# Patient Record
Sex: FEMALE | Race: WHITE | Employment: UNEMPLOYED | URBAN - METROPOLITAN AREA
[De-identification: names, ages, dates, MRNs, and addresses within clinical notes are randomized per-mention and may not be internally consistent; named-entity substitution may affect disease eponyms.]

---

## 2017-01-06 ENCOUNTER — OFFICE VISIT (OUTPATIENT)
Dept: PAIN MANAGEMENT | Age: 62
End: 2017-01-06

## 2017-01-06 VITALS
SYSTOLIC BLOOD PRESSURE: 119 MMHG | DIASTOLIC BLOOD PRESSURE: 80 MMHG | BODY MASS INDEX: 23.41 KG/M2 | WEIGHT: 128 LBS | HEART RATE: 89 BPM

## 2017-01-06 DIAGNOSIS — M54.12 CERVICAL RADICULOPATHY: ICD-10-CM

## 2017-01-06 DIAGNOSIS — G56.03 BILATERAL CARPAL TUNNEL SYNDROME: ICD-10-CM

## 2017-01-06 DIAGNOSIS — G54.4: ICD-10-CM

## 2017-01-06 DIAGNOSIS — G89.29 CHRONIC BILATERAL LOW BACK PAIN WITH RIGHT-SIDED SCIATICA: ICD-10-CM

## 2017-01-06 DIAGNOSIS — Z79.891 LONG TERM PRESCRIPTION OPIATE USE: ICD-10-CM

## 2017-01-06 DIAGNOSIS — M54.41 CHRONIC BILATERAL LOW BACK PAIN WITH RIGHT-SIDED SCIATICA: ICD-10-CM

## 2017-01-06 DIAGNOSIS — M47.22 OSTEOARTHRITIS OF SPINE WITH RADICULOPATHY, CERVICAL REGION: ICD-10-CM

## 2017-01-06 DIAGNOSIS — G89.4 CHRONIC PAIN SYNDROME: ICD-10-CM

## 2017-01-06 DIAGNOSIS — M54.17 LUMBOSACRAL NEURITIS: Primary | ICD-10-CM

## 2017-01-06 DIAGNOSIS — M48.02 CERVICAL SPINAL STENOSIS: ICD-10-CM

## 2017-01-06 DIAGNOSIS — M48.061 LUMBAR STENOSIS: ICD-10-CM

## 2017-01-06 DIAGNOSIS — M47.812 SPONDYLOSIS OF CERVICAL REGION WITHOUT MYELOPATHY OR RADICULOPATHY: ICD-10-CM

## 2017-01-06 RX ORDER — OXYCODONE HYDROCHLORIDE 20 MG/1
20 TABLET ORAL
Qty: 120 TAB | Refills: 0 | Status: SHIPPED | OUTPATIENT
Start: 2017-01-06 | End: 2017-03-29 | Stop reason: SDUPTHER

## 2017-01-06 RX ORDER — OXYCODONE HYDROCHLORIDE 20 MG/1
20 TABLET ORAL
Qty: 120 TAB | Refills: 0 | Status: SHIPPED | OUTPATIENT
Start: 2017-03-07 | End: 2017-04-06

## 2017-01-06 RX ORDER — OXYCODONE HYDROCHLORIDE 20 MG/1
20 TABLET ORAL
Qty: 120 TAB | Refills: 0 | Status: SHIPPED | OUTPATIENT
Start: 2017-02-06 | End: 2017-03-29 | Stop reason: SDUPTHER

## 2017-01-06 NOTE — MR AVS SNAPSHOT
Visit Information Date & Time Provider Department Dept. Phone Encounter #  
 1/6/2017  1:20 PM Jennifer Chance MD [facility] 21  Follow-up Instructions Return in about 3 months (around 4/6/2017). Upcoming Health Maintenance Date Due Hepatitis C Screening 1955 Pneumococcal 19-64 Medium Risk (1 of 1 - PPSV23) 5/4/1975 DTaP/Tdap/Td series (1 - Tdap) 5/4/1977 PAP AKA CERVICAL CYTOLOGY 5/4/1977 BREAST CANCER SCRN MAMMOGRAM 5/4/2006 FOBT Q 1 YEAR AGE 50-75 5/4/2006 ZOSTER VACCINE AGE 60> 5/4/2016 INFLUENZA AGE 9 TO ADULT 8/1/2016 Allergies as of 1/6/2017  Review Complete On: 1/6/2017 By: Jennifer Chance MD  
  
 Severity Noted Reaction Type Reactions Pcn [Penicillins]  07/09/2013    Swelling Current Immunizations  Never Reviewed No immunizations on file. Not reviewed this visit You Were Diagnosed With   
  
 Codes Comments Lumbosacral neuritis    -  Primary ICD-10-CM: M54.17 ICD-9-CM: 724.4 Lumbosacral root lesions, not elsewhere classified     ICD-10-CM: G54.4 ICD-9-CM: 353.4 Bilateral carpal tunnel syndrome     ICD-10-CM: G56.03 
ICD-9-CM: 354.0 Cervical radiculopathy     ICD-10-CM: M54.12 
ICD-9-CM: 723.4 Osteoarthritis of spine with radiculopathy, cervical region     ICD-10-CM: M47.22 
ICD-9-CM: 721.0 Chronic bilateral low back pain with right-sided sciatica     ICD-10-CM: M54.41, G89.29 ICD-9-CM: 724.2, 724.3, 338.29 Long term prescription opiate use     ICD-10-CM: S37.321 ICD-9-CM: V58.69 Chronic pain syndrome     ICD-10-CM: G89.4 ICD-9-CM: 338.4 Lumbar stenosis     ICD-10-CM: M48.06 
ICD-9-CM: 724.02 Spondylosis of cervical region without myelopathy or radiculopathy     ICD-10-CM: M47.812 ICD-9-CM: 721.0 Cervical spinal stenosis     ICD-10-CM: M48.02 
ICD-9-CM: 723.0 Vitals BP Pulse Weight(growth percentile) BMI OB Status Smoking Status 119/80 89 128 lb (58.1 kg) 23.41 kg/m2 Postmenopausal Current Every Day Smoker Vitals History BMI and BSA Data Body Mass Index Body Surface Area  
 23.41 kg/m 2 1.59 m 2 Preferred Pharmacy Pharmacy Name Phone [place] [address] [phone] Your Updated Medication List  
  
   
This list is accurate as of: 1/6/17  2:03 PM.  Always use your most recent med list.  
  
  
  
  
 aspirin delayed-release 81 mg tablet Take  by mouth daily. Gal Wade Use as directed to assist with ambulation DULoxetine 20 mg capsule Commonly known as:  CYMBALTA Take 20 mg by mouth daily. ergocalciferol 50,000 unit capsule Commonly known as:  ERGOCALCIFEROL Take 50,000 Units by mouth.  
  
 gabapentin 300 mg capsule Commonly known as:  NEURONTIN Take 1 capsule once in the evening for one week, then 2 times a day thereafter. #53 LATUDA 20 mg Tab tablet Generic drug:  lurasidone Take  by mouth. PriLOSEC 20 mg capsule Generic drug:  omeprazole Take 20 mg by mouth daily. primidone 50 mg tablet Commonly known as: MYSOLINE Take 50 mg by mouth two (2) times a day. pyridoxine (vitamin B6) 100 mg tablet Commonly known as:  VITAMIN B-6 Take 100 mg by mouth daily. raNITIdine 75 mg tablet Commonly known as:  ZANTAC Take 75 mg by mouth two (2) times a day. * ROXICODONE 15 mg immediate release tablet Generic drug:  oxyCODONE IR Take 15 mg by mouth four (4) times daily. * oxyCODONE IR 20 mg immediate release tablet Commonly known as:  Vida Guzman Take 1 Tab by mouth four (4) times daily as needed for up to 30 days. Max Daily Amount: 80 mg. Indications: PAIN  
  
 * oxyCODONE IR 20 mg immediate release tablet Commonly known as:  Vida Guzman  
 Take 1 Tab by mouth four (4) times daily as needed for up to 30 days. Max Daily Amount: 80 mg. Indications: PAIN Start taking on:  2/6/2017 * oxyCODONE IR 20 mg immediate release tablet Commonly known as:  Kenyetta Oh Take 1 Tab by mouth four (4) times daily as needed for up to 30 days. Max Daily Amount: 80 mg. Indications: PAIN Start taking on:  3/7/2017  
  
 simvastatin 20 mg tablet Commonly known as:  ZOCOR Take  by mouth nightly. sucralfate 1 gram tablet Commonly known as:  Serena Murray Take 1 g by mouth four (4) times daily. VALIUM 5 mg tablet Generic drug:  diazePAM  
Take 5 mg by mouth daily as needed. * Notice: This list has 4 medication(s) that are the same as other medications prescribed for you. Read the directions carefully, and ask your doctor or other care provider to review them with you. Prescriptions Printed Refills  
 oxyCODONE IR (ROXICODONE) 20 mg immediate release tablet 0 Starting on: 3/7/2017 Sig: Take 1 Tab by mouth four (4) times daily as needed for up to 30 days. Max Daily Amount: 80 mg. Indications: PAIN Class: Print Route: Oral  
 oxyCODONE IR (ROXICODONE) 20 mg immediate release tablet 0 Starting on: 2/6/2017 Sig: Take 1 Tab by mouth four (4) times daily as needed for up to 30 days. Max Daily Amount: 80 mg. Indications: PAIN Class: Print Route: Oral  
 oxyCODONE IR (ROXICODONE) 20 mg immediate release tablet 0 Sig: Take 1 Tab by mouth four (4) times daily as needed for up to 30 days. Max Daily Amount: 80 mg. Indications: PAIN Class: Print Route: Oral  
  
Follow-up Instructions Return in about 3 months (around 4/6/2017). Introducing [facility] SERVICES! Yeimy Hutton introduces [employer] patient portal. Now you can access parts of your medical record, email your doctor's office, and request medication refills online. 1. In your internet browser, go to [email or web address] 2. Click on the First Time User? Click Here link in the Sign In box. You will see the New Member Sign Up page. 3. Enter your [employer] Access Code exactly as it appears below. You will not need to use this code after youve completed the sign-up process. If you do not sign up before the expiration date, you must request a new code. · [employer] Access Code: [email or web address] Expires: 1/12/2017 12:08 PM 
 
4. Enter the last four digits of your Social Security Number (xxxx) and Date of Birth (mm/dd/yyyy) as indicated and click Submit. You will be taken to the next sign-up page. 5. Create a [employer]iModel ID. This will be your [employer]iModel login ID and cannot be changed, so think of one that is secure and easy to remember. 6. Create a [employer] password. You can change your password at any time. 7. Enter your Password Reset Question and Answer. This can be used at a later time if you forget your password. 8. Enter your e-mail address. You will receive e-mail notification when new information is available in [address] 9. Click Sign Up. You can now view and download portions of your medical record. 10. Click the Download Summary menu link to download a portable copy of your medical information. If you have questions, please visit the Frequently Asked Questions section of the [employer] website. Remember, [employer] is NOT to be used for urgent needs. For medical emergencies, dial 911. Now available from your iPhone and Android! Please provide this summary of care documentation to your next provider. Your primary care clinician is listed as Christiano Bernard. If you have any questions after today's visit, please call [phone].

## 2017-01-06 NOTE — PROGRESS NOTES
Nursing Notes    Patient presents to the office today in follow-up. Patient rates her pain at 3/10 on the numerical pain scale. Reviewed medications with counts as follows:    Rx Date filled Qty Dispensed Pill Count Last Dose Short   tamara 20 12/10/16 120 16 1/6/17 no         Comments:     POC UDS was not performed in office today    Any new labs or imaging since last appointment? YES, blood work for c/u  Have you been to an emergency room (ER) or urgent care clinic since your last visit? NO            Have you been hospitalized since your last visit? NO     If yes, where, when, and reason for visit? Have you seen or consulted any other health care providers outside of the [address] [place] [address]  since your last visit? NO     If yes, where, when, and reason for visit? Ms. Leann Vazquez has a reminder for a \"due or due soon\" health maintenance. I have asked that she contact her primary care provider for follow-up on this health maintenance.

## 2017-01-06 NOTE — PROGRESS NOTES
HISTORY OF PRESENT ILLNESS  Bibiana Scott is a 62 y.o. female. HPI she returns for follow-up of chronic, severe pain involving the cervical spine related to underlying cervical stenosis, degenerative disc disease, and radiculopathy as well as pain involving the lumbar spine with radiation down the right lower extremity, anteriorly posteriorly, related to degenerative disc disease, spondylosis, and radiculopathy. She also suffers from bilateral median nerve entrapment carpal tunnel. Pain has been under excellent control, averaging 3 out of 10. Pain level today 3 out of 10, outcome 10/28,(The lower the upper number, the better the outcome)  Physical activity and mobility as well as mood are fair, sleep is poor. No reported side effects. A review of the [place] prescription monitoring program does not identify any inconsistency. Review of Systems   Constitutional: Positive for malaise/fatigue and weight loss. Negative for chills and fever. HENT: Negative for congestion and sore throat. Eyes: Negative for blurred vision (h/o hypoglycemia which will cause some blurry vision) and double vision. Respiratory: Positive for cough. Negative for shortness of breath. COPD  Bronchitis (using inhaler and on antibiotics)  Cigarette smoker   Cardiovascular: Negative for chest pain and leg swelling. Gastrointestinal: Positive for heartburn. Negative for constipation, diarrhea, nausea and vomiting. Genitourinary: Negative for hematuria. Musculoskeletal: Positive for back pain, joint pain (left shoulder), myalgias and neck pain. Negative for falls. Skin: Negative. Neurological: Positive for tremors (primidone manages), sensory change (right arm) and headaches (\"constantly\"-h/o migraines (had recent brain MRI(). Negative for dizziness, tingling, seizures and weakness. Endo/Heme/Allergies: Negative for environmental allergies. Does not bruise/bleed easily.    Psychiatric/Behavioral: Positive for depression. Negative for substance abuse and suicidal ideas. The patient is nervous/anxious and has insomnia. Bipolar  Sees psychiatry monthly (Dr. Adrain Soulier)       Physical Exam   Constitutional: She is oriented to person, place, and time. She appears well-developed and well-nourished. No distress. HENT:   Head: Normocephalic. Eyes: Conjunctivae and EOM are normal. Pupils are equal, round, and reactive to light.   glasses   Neck: Normal range of motion. No thyromegaly present. Painful ROM   Pulmonary/Chest: Effort normal. No respiratory distress. Musculoskeletal: She exhibits tenderness (neck/lumbar). She exhibits no edema. Cervical back: She exhibits decreased range of motion, tenderness, pain and spasm. Lumbar back: She exhibits decreased range of motion, tenderness, pain and spasm. Neurological: She is alert and oriented to person, place, and time. No cranial nerve deficit (grossly intact). Gait (antalgic) abnormal.   Psychiatric: She has a normal mood and affect. Her behavior is normal. Judgment and thought content normal.   Nursing note and vitals reviewed. ASSESSMENT and PLAN  Encounter Diagnoses   Name Primary?  Lumbosacral neuritis Yes    Lumbosacral root lesions, not elsewhere classified     Bilateral carpal tunnel syndrome     Cervical radiculopathy     Osteoarthritis of spine with radiculopathy, cervical region     Chronic bilateral low back pain with right-sided sciatica     Long term prescription opiate use     Chronic pain syndrome     Lumbar stenosis     Spondylosis of cervical region without myelopathy or radiculopathy     Cervical spinal stenosis      She will continue on her current analgesic regimen at this is providing excellent pain control with improve functionality and minimal side effects. 3 month reassess her    No concerns are raised for misuse, abuse, or diversion.     1. Pain medications are prescribed with the objective of pain relief and improved physical and psychosocial function in this patient. 2. Counseled patient on proper use of prescribed medications and reviewed opioid contract. 3. Counseled patient about chronic medical conditions and their relationship to anxiety and depression and recommended mental health support as needed. 4. Reviewed with patient self-help tools, home exercise, and lifestyle changes to assist the patient in self-management of symptoms. 5. Advised patient to have a primary care provider to continue care for health maintenance and general medical conditions and support for referral to specialty care as needed. 6. Reviewed with patient the treatment plan, goals of treatment plan, and limitations of treatment plan, to include the potential for side effects from medications and procedures. If side effects occur, it is the responsibility of the patient to inform the clinic so that a change in the treatment plan can be made in a safe manner. The patient is advised that stopping prescribed medication may cause an increase in symptoms and possible medication withdrawal symptoms. The patient is informed an emergency room evaluation may be necessary if this occurs. DISPOSITION: The patients condition and plan were discussed at length and all questions were answered. The patient agrees with the plan.     Counseling occupied > 50% of visit:  Total time: 30 minutes

## 2017-03-29 ENCOUNTER — OFFICE VISIT (OUTPATIENT)
Dept: PAIN MANAGEMENT | Age: 62
End: 2017-03-29

## 2017-03-29 VITALS
SYSTOLIC BLOOD PRESSURE: 141 MMHG | HEART RATE: 104 BPM | WEIGHT: 128 LBS | DIASTOLIC BLOOD PRESSURE: 90 MMHG | BODY MASS INDEX: 23.41 KG/M2

## 2017-03-29 DIAGNOSIS — Z79.891 LONG TERM PRESCRIPTION OPIATE USE: ICD-10-CM

## 2017-03-29 DIAGNOSIS — M54.17 LUMBOSACRAL NEURITIS: Primary | ICD-10-CM

## 2017-03-29 DIAGNOSIS — M54.12 CERVICAL RADICULOPATHY: ICD-10-CM

## 2017-03-29 DIAGNOSIS — G56.03 BILATERAL CARPAL TUNNEL SYNDROME: ICD-10-CM

## 2017-03-29 DIAGNOSIS — G89.29 CHRONIC BILATERAL LOW BACK PAIN WITH RIGHT-SIDED SCIATICA: ICD-10-CM

## 2017-03-29 DIAGNOSIS — M48.061 LUMBAR STENOSIS: ICD-10-CM

## 2017-03-29 DIAGNOSIS — G89.4 CHRONIC PAIN SYNDROME: ICD-10-CM

## 2017-03-29 DIAGNOSIS — M48.02 CERVICAL SPINAL STENOSIS: ICD-10-CM

## 2017-03-29 DIAGNOSIS — Z79.899 ENCOUNTER FOR LONG-TERM (CURRENT) USE OF MEDICATIONS: ICD-10-CM

## 2017-03-29 DIAGNOSIS — M47.22 OSTEOARTHRITIS OF SPINE WITH RADICULOPATHY, CERVICAL REGION: ICD-10-CM

## 2017-03-29 DIAGNOSIS — M54.41 CHRONIC BILATERAL LOW BACK PAIN WITH RIGHT-SIDED SCIATICA: ICD-10-CM

## 2017-03-29 LAB
ALCOHOL UR POC: NORMAL
AMPHETAMINES UR POC: NEGATIVE
BARBITURATES UR POC: NEGATIVE
BENZODIAZEPINES UR POC: NORMAL
BUPRENORPHINE UR POC: NORMAL
CANNABINOIDS UR POC: NEGATIVE
CARISOPRODOL UR POC: NORMAL
COCAINE UR POC: NEGATIVE
FENTANYL UR POC: NORMAL
MDMA/ECSTASY UR POC: NEGATIVE
METHADONE UR POC: NEGATIVE
METHAMPHETAMINE UR POC: NEGATIVE
METHYLPHENIDATE UR POC: NEGATIVE
OPIATES UR POC: NEGATIVE
OXYCODONE UR POC: NORMAL
PHENCYCLIDINE UR POC: NEGATIVE
PROPOXYPHENE UR POC: NORMAL
TRAMADOL UR POC: NORMAL
TRICYCLICS UR POC: NEGATIVE

## 2017-03-29 RX ORDER — OXYCODONE HYDROCHLORIDE 20 MG/1
20 TABLET ORAL
Qty: 120 TAB | Refills: 0 | Status: SHIPPED | OUTPATIENT
Start: 2017-05-03 | End: 2017-05-26 | Stop reason: SDUPTHER

## 2017-03-29 RX ORDER — MORPHINE SULFATE 15 MG/1
15 TABLET, FILM COATED, EXTENDED RELEASE ORAL 2 TIMES DAILY
Qty: 60 TAB | Refills: 0 | Status: SHIPPED | OUTPATIENT
Start: 2017-03-29 | End: 2017-05-26 | Stop reason: SDUPTHER

## 2017-03-29 RX ORDER — OXYCODONE HYDROCHLORIDE 20 MG/1
20 TABLET ORAL
Qty: 120 TAB | Refills: 0 | Status: SHIPPED | OUTPATIENT
Start: 2017-04-05 | End: 2017-05-26 | Stop reason: SDUPTHER

## 2017-03-29 RX ORDER — BUPROPION HYDROCHLORIDE 100 MG/1
300 TABLET ORAL
COMMUNITY
End: 2018-05-25

## 2017-03-29 RX ORDER — MORPHINE SULFATE 15 MG/1
15 TABLET, FILM COATED, EXTENDED RELEASE ORAL 2 TIMES DAILY
Qty: 60 TAB | Refills: 0 | Status: SHIPPED | OUTPATIENT
Start: 2017-04-27 | End: 2017-05-26 | Stop reason: SDUPTHER

## 2017-03-29 NOTE — MR AVS SNAPSHOT
Visit Information Date & Time Provider Department Dept. Phone Encounter #  
 3/29/2017  2:20 PM Rangel Duarte MD [facility] [phone] Follow-up Instructions Return in about 2 months (around 5/29/2017). Your Appointments 5/24/2017  2:00 PM  
Follow Up with YANG Balderas [facility] (BC SCHEDULING) Appt Note: return in 2 months [address] [facility] [address] [phone] Crystal Waller [phone] Upcoming Health Maintenance Date Due Hepatitis C Screening 1955 Pneumococcal 19-64 Medium Risk (1 of 1 - PPSV23) 5/4/1975 DTaP/Tdap/Td series (1 - Tdap) 5/4/1977 PAP AKA CERVICAL CYTOLOGY 5/4/1977 BREAST CANCER SCRN MAMMOGRAM 5/4/2006 FOBT Q 1 YEAR AGE 50-75 5/4/2006 ZOSTER VACCINE AGE 60> 5/4/2016 INFLUENZA AGE 9 TO ADULT 8/1/2016 Allergies as of 3/29/2017  Review Complete On: 3/29/2017 By: Rangel Duarte MD  
  
 Severity Noted Reaction Type Reactions Pcn [Penicillins]  07/09/2013    Swelling Current Immunizations  Never Reviewed No immunizations on file. Not reviewed this visit You Were Diagnosed With   
  
 Codes Comments Lumbosacral neuritis    -  Primary ICD-10-CM: M54.17 ICD-9-CM: 724.4 Encounter for long-term (current) use of medications     ICD-10-CM: Z79.899 ICD-9-CM: V58.69 Bilateral carpal tunnel syndrome     ICD-10-CM: G56.03 
ICD-9-CM: 354.0 Cervical radiculopathy     ICD-10-CM: M54.12 
ICD-9-CM: 723.4 Osteoarthritis of spine with radiculopathy, cervical region     ICD-10-CM: M47.22 
ICD-9-CM: 721.0 Chronic bilateral low back pain with right-sided sciatica     ICD-10-CM: M54.41, G89.29 ICD-9-CM: 724.2, 724.3, 338.29 Long term prescription opiate use     ICD-10-CM: G12.157 ICD-9-CM: V58.69 Chronic pain syndrome     ICD-10-CM: G89.4 ICD-9-CM: 338.4 Lumbar stenosis     ICD-10-CM: M48.06 
ICD-9-CM: 724.02 Cervical spinal stenosis     ICD-10-CM: M48.02 
ICD-9-CM: 723.0 Vitals BP Pulse Weight(growth percentile) BMI OB Status Smoking Status 141/90 (!) 104 128 lb (58.1 kg) 23.41 kg/m2 Postmenopausal Current Every Day Smoker BMI and BSA Data Body Mass Index Body Surface Area  
 23.41 kg/m 2 1.59 m 2 Preferred Pharmacy Pharmacy Name Phone [address] [place] [address] [phone] Your Updated Medication List  
  
   
This list is accurate as of: 3/29/17  2:45 PM.  Always use your most recent med list.  
  
  
  
  
 aspirin delayed-release 81 mg tablet Take  by mouth daily. buPROPion 100 mg tablet Commonly known as:  [facility] Take 300 mg by mouth. Eleanora Mi Use as directed to assist with ambulation DULoxetine 20 mg capsule Commonly known as:  CYMBALTA Take 20 mg by mouth daily. ergocalciferol 50,000 unit capsule Commonly known as:  ERGOCALCIFEROL Take 50,000 Units by mouth.  
  
 gabapentin 300 mg capsule Commonly known as:  NEURONTIN Take 1 capsule once in the evening for one week, then 2 times a day thereafter. #53 LATUDA 20 mg Tab tablet Generic drug:  lurasidone Take  by mouth. * morphine CR 15 mg CR tablet Commonly known as:  MS CONTIN Take 1 Tab by mouth two (2) times a day for 30 days. Max Daily Amount: 30 mg. For chronic pain due to fill 1/8/16  Indications: Chronic Pain, Severe Pain * morphine CR 15 mg CR tablet Commonly known as:  MS CONTIN Take 1 Tab by mouth two (2) times a day for 30 days. Max Daily Amount: 30 mg. Indications: Chronic Pain, Severe Pain Start taking on:  4/27/2017 PriLOSEC 20 mg capsule Generic drug:  omeprazole Take 20 mg by mouth daily. primidone 50 mg tablet Commonly known as: MYSOLINE Take 50 mg by mouth two (2) times a day. pyridoxine (vitamin B6) 100 mg tablet Commonly known as:  VITAMIN B-6 Take 100 mg by mouth daily. raNITIdine 75 mg tablet Commonly known as:  ZANTAC Take 75 mg by mouth two (2) times a day. * ROXICODONE 15 mg immediate release tablet Generic drug:  oxyCODONE IR Take 15 mg by mouth four (4) times daily. * oxyCODONE IR 20 mg immediate release tablet Commonly known as:  Rex Salen Take 1 Tab by mouth four (4) times daily as needed for up to 30 days. Max Daily Amount: 80 mg. Indications: PAIN  
  
 * oxyCODONE IR 20 mg immediate release tablet Commonly known as:  [place]downe Salen Take 1 Tab by mouth four (4) times daily as needed for up to 30 days. Max Daily Amount: 80 mg. Indications: Pain Start taking on:  4/5/2017 * oxyCODONE IR 20 mg immediate release tablet Commonly known as:  [place]ne Salen Take 1 Tab by mouth four (4) times daily as needed for up to 30 days. Max Daily Amount: 80 mg. Indications: Pain Start taking on:  5/3/2017  
  
 simvastatin 20 mg tablet Commonly known as:  ZOCOR Take  by mouth nightly. sucralfate 1 gram tablet Commonly known as:  Charm Oh Take 1 g by mouth four (4) times daily. VALIUM 5 mg tablet Generic drug:  diazePAM  
Take 5 mg by mouth daily as needed. * Notice: This list has 6 medication(s) that are the same as other medications prescribed for you. Read the directions carefully, and ask your doctor or other care provider to review them with you. Prescriptions Printed Refills  
 morphine CR (MS CONTIN) 15 mg CR tablet 0 Starting on: 4/27/2017 Sig: Take 1 Tab by mouth two (2) times a day for 30 days. Max Daily Amount: 30 mg. Indications: Chronic Pain, Severe Pain Class: Print Route: Oral  
 morphine CR (MS CONTIN) 15 mg CR tablet 0 Sig: Take 1 Tab by mouth two (2) times a day for 30 days. Max Daily Amount: 30 mg.  For chronic pain due to fill 1/8/16  Indications: Chronic Pain, Severe Pain Class: Print Route: Oral  
 oxyCODONE IR (ROXICODONE) 20 mg immediate release tablet 0 Starting on: 5/3/2017 Sig: Take 1 Tab by mouth four (4) times daily as needed for up to 30 days. Max Daily Amount: 80 mg. Indications: Pain Class: Print Route: Oral  
 oxyCODONE IR (ROXICODONE) 20 mg immediate release tablet 0 Starting on: 4/5/2017 Sig: Take 1 Tab by mouth four (4) times daily as needed for up to 30 days. Max Daily Amount: 80 mg. Indications: Pain Class: Print Route: Oral  
  
We Performed the Following AMB POC DRUG SCREEN () [ [facility]] DRUG SCREEN [[id number] Custom] Follow-up Instructions Return in about 2 months (around 5/29/2017). Patient Instructions Current health maintenance issues were reviewed and the patient was advised to followup with his/her PCP for completion of these items. Introducing [facility]! Tatyana Ruiz introduces [employer] patient portal. Now you can access parts of your medical record, email your doctor's office, and request medication refills online. 1. In your internet browser, go to [email or web address] 2. Click on the First Time User? Click Here link in the Sign In box. You will see the New Member Sign Up page. 3. Enter your [employer] Access Code exactly as it appears below. You will not need to use this code after youve completed the sign-up process. If you do not sign up before the expiration date, you must request a new code. · [employer] Access Code: [email or web address] Expires: 5/31/2017  2:45 PM 
 
4. Enter the last four digits of your Social Security Number (xxxx) and Date of Birth (mm/dd/yyyy) as indicated and click Submit. You will be taken to the next sign-up page. 5. Create a [employer] ID. This will be your [employer] login ID and cannot be changed, so think of one that is secure and easy to remember. 6. Create a [employer] password. You can change your password at any time. 7. Enter your Password Reset Question and Answer. This can be used at a later time if you forget your password. 8. Enter your e-mail address. You will receive e-mail notification when new information is available in [address] 9. Click Sign Up. You can now view and download portions of your medical record. 10. Click the Download Summary menu link to download a portable copy of your medical information. If you have questions, please visit the Frequently Asked Questions section of the [employer] website. Remember, [employer] is NOT to be used for urgent needs. For medical emergencies, dial 911. Now available from your iPhone and Android! Please provide this summary of care documentation to your next provider. Your primary care clinician is listed as Linsey Leija. If you have any questions after today's visit, please call [phone].

## 2017-03-29 NOTE — PROGRESS NOTES
HISTORY OF PRESENT ILLNESS  Abdon Rhodes is a 62 y.o. female. HPI she returns for follow-up of chronic, severe pain involving the cervical spine related to underlying cervical stenosis, degenerative disc disease, and radiculopathy as well as pain involving the lumbar spine with radiation down the right lower extremity, anteriorly posteriorly, related to degenerative disc disease, spondylosis, and radiculopathy. She also suffers from bilateral median nerve entrapment carpal tunnel. She has noted that her headaches are becoming more problematic once again. She indicates that this was alleviated to a great degree by the use of MS Contin previously and this will be reinitiated at 15 mg twice daily. Pain level today 5 out of 10, outcome 12/28,(The lower the upper number, the better the outcome)  Physical activity and mobility as well as mood are fair, sleep is poor. No reported side effects. A current review of the  does not identify any inconsistency. UDS obtained and reviewed; formal confirmation from laboratory is pending. Review of Systems   Constitutional: Positive for malaise/fatigue and weight loss. Negative for chills and fever. HENT: Negative for congestion and sore throat. Eyes: Negative for blurred vision (h/o hypoglycemia which will cause some blurry vision) and double vision. Respiratory: Positive for cough. Negative for shortness of breath. COPD  Bronchitis (using inhaler and on antibiotics)  Cigarette smoker   Cardiovascular: Negative for chest pain and leg swelling. Gastrointestinal: Positive for heartburn. Negative for constipation, diarrhea, nausea and vomiting. Genitourinary: Negative for hematuria. Musculoskeletal: Positive for back pain, joint pain (left shoulder), myalgias and neck pain. Negative for falls. Skin: Negative.     Neurological: Positive for tremors (primidone manages), sensory change (right arm) and headaches (\"constantly\"-h/o migraines (had recent brain MRI(). Negative for dizziness, tingling, seizures and weakness. Endo/Heme/Allergies: Negative for environmental allergies. Does not bruise/bleed easily. Psychiatric/Behavioral: Positive for depression. Negative for substance abuse and suicidal ideas. The patient is nervous/anxious and has insomnia. Bipolar  Sees psychiatry monthly (Dr. Abdiaziz Sotelo)   All other systems reviewed and are negative. Physical Exam   Constitutional: She is oriented to person, place, and time. She appears well-developed and well-nourished. No distress. HENT:   Head: Normocephalic. Eyes: Conjunctivae and EOM are normal. Pupils are equal, round, and reactive to light.   glasses   Neck: Normal range of motion. No thyromegaly present. Painful ROM   Pulmonary/Chest: Effort normal. No respiratory distress. Musculoskeletal: She exhibits tenderness (neck/lumbar). She exhibits no edema. Cervical back: She exhibits decreased range of motion, tenderness, pain and spasm. Lumbar back: She exhibits decreased range of motion, tenderness, pain and spasm. Neurological: She is alert and oriented to person, place, and time. No cranial nerve deficit (grossly intact). Gait (antalgic) abnormal.   Psychiatric: She has a normal mood and affect. Her behavior is normal. Judgment and thought content normal.   Nursing note and vitals reviewed. ASSESSMENT and PLAN  Encounter Diagnoses   Name Primary?  Lumbosacral neuritis Yes    Encounter for long-term (current) use of medications     Bilateral carpal tunnel syndrome     Cervical radiculopathy     Osteoarthritis of spine with radiculopathy, cervical region     Chronic bilateral low back pain with right-sided sciatica     Long term prescription opiate use     Chronic pain syndrome     Lumbar stenosis     Cervical spinal stenosis      Treatment plan as noted above.   Because the patient's current regimen places him/her at increased risk for possible overdose, a prescription for naloxone nasal spray is being provided. The patient understands that this medication is only to be used in the setting of a possible overdose and that inadvertent use of this medication could precipitate overt withdrawal.    2 month reassess her    No concerns are raised for misuse, abuse, or diversion. 1. Pain medications are prescribed with the objective of pain relief and improved physical and psychosocial function in this patient. 2. Counseled patient on proper use of prescribed medications and reviewed opioid contract. 3. Counseled patient about chronic medical conditions and their relationship to anxiety and depression and recommended mental health support as needed. 4. Reviewed with patient self-help tools, home exercise, and lifestyle changes to assist the patient in self-management of symptoms. 5. Advised patient to have a primary care provider to continue care for health maintenance and general medical conditions and support for referral to specialty care as needed. 6. Reviewed with patient the treatment plan, goals of treatment plan, and limitations of treatment plan, to include the potential for side effects from medications and procedures. If side effects occur, it is the responsibility of the patient to inform the clinic so that a change in the treatment plan can be made in a safe manner. The patient is advised that stopping prescribed medication may cause an increase in symptoms and possible medication withdrawal symptoms. The patient is informed an emergency room evaluation may be necessary if this occurs. DISPOSITION: The patients condition and plan were discussed at length and all questions were answered. The patient agrees with the plan.     Counseling occupied > 50% of visit:  Total time: 30 minutes

## 2017-03-29 NOTE — PROGRESS NOTES
Nursing Notes    Patient presents to the office today in follow-up. Patient rates her pain at 5/10 on the numerical pain scale. Reviewed medications with counts as follows:    Rx Date filled Qty Dispensed Pill Count Last Dose Short   tamara 20 3/7/17 120 40 3/29/17 no       Comments:     POC UDS was performed in office today    Any new labs or imaging since last appointment? Lung CT for c/u    Have you been to an emergency room (ER) or urgent care clinic since your last visit? NO            Have you been hospitalized since your last visit? NO     If yes, where, when, and reason for visit? Have you seen or consulted any other health care providers outside of the [address] [place] [address]  since your last visit? Yes, pulmonologist   If yes, where, when, and reason for visit? Ms. Yandel Hood has a reminder for a \"due or due soon\" health maintenance. I have asked that she contact her primary care provider for follow-up on this health maintenance.

## 2017-05-26 ENCOUNTER — OFFICE VISIT (OUTPATIENT)
Dept: PAIN MANAGEMENT | Age: 62
End: 2017-05-26

## 2017-05-26 VITALS
SYSTOLIC BLOOD PRESSURE: 147 MMHG | HEART RATE: 76 BPM | WEIGHT: 128 LBS | BODY MASS INDEX: 23.41 KG/M2 | DIASTOLIC BLOOD PRESSURE: 89 MMHG

## 2017-05-26 DIAGNOSIS — M48.02 CERVICAL SPINAL STENOSIS: ICD-10-CM

## 2017-05-26 DIAGNOSIS — M47.816 SPONDYLOSIS OF LUMBAR REGION WITHOUT MYELOPATHY OR RADICULOPATHY: ICD-10-CM

## 2017-05-26 DIAGNOSIS — M47.26 OSTEOARTHRITIS OF SPINE WITH RADICULOPATHY, LUMBAR REGION: ICD-10-CM

## 2017-05-26 DIAGNOSIS — M54.17 LUMBOSACRAL NEURITIS: ICD-10-CM

## 2017-05-26 DIAGNOSIS — M48.061 LUMBAR STENOSIS: ICD-10-CM

## 2017-05-26 DIAGNOSIS — G89.29 CHRONIC BILATERAL LOW BACK PAIN WITH RIGHT-SIDED SCIATICA: ICD-10-CM

## 2017-05-26 DIAGNOSIS — G89.4 CHRONIC PAIN SYNDROME: ICD-10-CM

## 2017-05-26 DIAGNOSIS — M54.41 CHRONIC BILATERAL LOW BACK PAIN WITH RIGHT-SIDED SCIATICA: ICD-10-CM

## 2017-05-26 DIAGNOSIS — M47.22 OSTEOARTHRITIS OF SPINE WITH RADICULOPATHY, CERVICAL REGION: ICD-10-CM

## 2017-05-26 DIAGNOSIS — M47.812 SPONDYLOSIS OF CERVICAL REGION WITHOUT MYELOPATHY OR RADICULOPATHY: ICD-10-CM

## 2017-05-26 RX ORDER — NALOXONE HYDROCHLORIDE 4 MG/.1ML
4 SPRAY NASAL AS NEEDED
Qty: 1 PACKAGE | Refills: 0 | Status: SHIPPED | OUTPATIENT
Start: 2017-05-26

## 2017-05-26 RX ORDER — OXYCODONE HYDROCHLORIDE 20 MG/1
20 TABLET ORAL
Qty: 120 TAB | Refills: 0 | Status: SHIPPED | OUTPATIENT
Start: 2017-06-03 | End: 2017-07-03

## 2017-05-26 RX ORDER — MORPHINE SULFATE 15 MG/1
15 TABLET, FILM COATED, EXTENDED RELEASE ORAL 2 TIMES DAILY
Qty: 60 TAB | Refills: 0 | Status: SHIPPED | OUTPATIENT
Start: 2017-06-28 | End: 2017-07-21 | Stop reason: SDUPTHER

## 2017-05-26 RX ORDER — OXYCODONE HYDROCHLORIDE 20 MG/1
20 TABLET ORAL
Qty: 120 TAB | Refills: 0 | Status: SHIPPED | OUTPATIENT
Start: 2017-07-02 | End: 2017-07-21 | Stop reason: SDUPTHER

## 2017-05-26 RX ORDER — MORPHINE SULFATE 15 MG/1
15 TABLET, FILM COATED, EXTENDED RELEASE ORAL 2 TIMES DAILY
Qty: 60 TAB | Refills: 0 | Status: SHIPPED | OUTPATIENT
Start: 2017-05-29 | End: 2017-06-28

## 2017-05-26 NOTE — PATIENT INSTRUCTIONS
1. Continue current plan with no evidence of addiction or diversion. Stable on current medication without adverse events. 2. Refill Oxycodone 20 mg up to 4 times daily as needed. 3. Refill morphine ER 15 mg every 12 hours. 4. Continue Zanaflex 4 mg 2 times daily as needed. 5. Schedule repeat cervical C7 - T1 interlaminar injection with  Dr. Dutch Cole  6. Add naloxone 4 mg nasal spray for opioid induced respiratory depression emergency only. Extensive counseling was provided regarding this medication. Instructions were given to take home  7. Discussed risks of addiction, dependency, and opioid induced hyperalgesia.    8. Return to clinic in 2 months with Dr. Barron Laws or MM

## 2017-05-26 NOTE — PROGRESS NOTES
Nursing Notes    Patient presents to the office today in follow-up. Patient rates her pain at 6/10 on the numerical pain scale. Reviewed medications with counts as follows:    Rx Date filled Qty Dispensed Pill Count Last Dose Short   Morphine sulfate 15mg ER 04/30/17 60 15 Today  No    Oxycodone 20mg 05/04/17 120 40 Today  No                                     Comments:     POC UDS was not performed in office today    Any new labs or imaging since last appointment? NO    Have you been to an emergency room (ER) or urgent care clinic since your last visit? NO            Have you been hospitalized since your last visit? NO     If yes, where, when, and reason for visit? Have you seen or consulted any other health care providers outside of the [address] [place] [address]  since your last visit? YES     If yes, where, when, and reason for visit? Psychiatry         HM deferred to pcp.

## 2017-05-26 NOTE — MR AVS SNAPSHOT
Visit Information Date & Time Provider Department Dept. Phone Encounter #  
 5/26/2017 12:20 PM [facility] [phone] Follow-up Instructions Return in about 2 months (around 7/26/2017). Upcoming Health Maintenance Date Due Hepatitis C Screening 1955 Pneumococcal 19-64 Medium Risk (1 of 1 - PPSV23) 5/4/1975 DTaP/Tdap/Td series (1 - Tdap) 5/4/1977 PAP AKA CERVICAL CYTOLOGY 5/4/1977 BREAST CANCER SCRN MAMMOGRAM 5/4/2006 FOBT Q 1 YEAR AGE 50-75 5/4/2006 ZOSTER VACCINE AGE 60> 5/4/2016 INFLUENZA AGE 9 TO ADULT 8/1/2017 Allergies as of 5/26/2017  Review Complete On: 5/26/2017 By: YANG Almanzar Severity Noted Reaction Type Reactions Pcn [Penicillins]  07/09/2013    Swelling Current Immunizations  Never Reviewed No immunizations on file. Not reviewed this visit You Were Diagnosed With   
  
 Codes Comments Chronic bilateral low back pain with right-sided sciatica     ICD-10-CM: M54.41, G89.29 ICD-9-CM: 724.2, 724.3, 338.29 Chronic pain syndrome     ICD-10-CM: G89.4 ICD-9-CM: 338.4 Osteoarthritis of spine with radiculopathy, lumbar region     ICD-10-CM: M47.26 
ICD-9-CM: 721.3 Lumbosacral neuritis     ICD-10-CM: M54.17 ICD-9-CM: 724.4 Lumbar stenosis     ICD-10-CM: M48.06 
ICD-9-CM: 724.02 Osteoarthritis of spine with radiculopathy, cervical region     ICD-10-CM: M47.22 
ICD-9-CM: 721.0 Spondylosis of cervical region without myelopathy or radiculopathy     ICD-10-CM: M47.812 ICD-9-CM: 721.0 Cervical spinal stenosis     ICD-10-CM: M48.02 
ICD-9-CM: 723.0 Spondylosis of lumbar region without myelopathy or radiculopathy     ICD-10-CM: M47.816 ICD-9-CM: 721.3 Vitals BP Pulse Weight(growth percentile) BMI OB Status Smoking Status  147/89 76 128 lb (58.1 kg) 23.41 kg/m2 Postmenopausal Current Every Day Smoker Vitals History BMI and BSA Data Body Mass Index Body Surface Area  
 23.41 kg/m 2 1.59 m 2 [facility] [facility] [address] [phone] Your Updated Medication List  
  
   
This list is accurate as of: 5/26/17  2:00 PM.  Always use your most recent med list.  
  
  
  
  
 aspirin delayed-release 81 mg tablet Take  by mouth daily. buPROPion 100 mg tablet Commonly known as:  [facility] Take 300 mg by mouth. Brenda Espinoza Use as directed to assist with ambulation DULoxetine 20 mg capsule Commonly known as:  CYMBALTA Take 20 mg by mouth daily. ergocalciferol 50,000 unit capsule Commonly known as:  ERGOCALCIFEROL Take 50,000 Units by mouth.  
  
 gabapentin 300 mg capsule Commonly known as:  NEURONTIN Take 1 capsule once in the evening for one week, then 2 times a day thereafter. #53 LATUDA 20 mg Tab tablet Generic drug:  lurasidone Take  by mouth. * morphine CR 15 mg CR tablet Commonly known as:  MS CONTIN Take 1 Tab by mouth two (2) times a day for 30 days. Max Daily Amount: 30 mg. Indications: Chronic Pain, Severe Pain Start taking on:  5/29/2017 * morphine CR 15 mg CR tablet Commonly known as:  MS CONTIN Take 1 Tab by mouth two (2) times a day for 30 days. Max Daily Amount: 30 mg. For chronic pain due to fill 1/8/16  Indications: Chronic Pain, Severe Pain Start taking on:  6/28/2017  
  
 naloxone 4 mg/actuation Spry 4 mg by Nasal route as needed. For emergency use only  Indications: OPIATE-INDUCED RESPIRATORY DEPRESSION PriLOSEC 20 mg capsule Generic drug:  omeprazole Take 20 mg by mouth daily. primidone 50 mg tablet Commonly known as: MYSOLINE Take 50 mg by mouth two (2) times a day. pyridoxine (vitamin B6) 100 mg tablet Commonly known as:  VITAMIN B-6 Take 100 mg by mouth daily. raNITIdine 75 mg tablet Commonly known as:  ZANTAC Take 75 mg by mouth two (2) times a day. * ROXICODONE 15 mg immediate release tablet Generic drug:  oxyCODONE IR Take 15 mg by mouth four (4) times daily. * oxyCODONE IR 20 mg immediate release tablet Commonly known as:  Herschell Pane Take 1 Tab by mouth four (4) times daily as needed for up to 30 days. Max Daily Amount: 80 mg. Indications: Pain Start taking on:  6/3/2017 * oxyCODONE IR 20 mg immediate release tablet Commonly known as:  Herschell Pane Take 1 Tab by mouth four (4) times daily as needed for up to 30 days. Max Daily Amount: 80 mg. Indications: Pain Start taking on:  7/2/2017  
  
 simvastatin 20 mg tablet Commonly known as:  ZOCOR Take  by mouth nightly. sucralfate 1 gram tablet Commonly known as:  Shahana Creamer Take 1 g by mouth four (4) times daily. VALIUM 5 mg tablet Generic drug:  diazePAM  
Take 5 mg by mouth daily as needed. * Notice: This list has 5 medication(s) that are the same as other medications prescribed for you. Read the directions carefully, and ask your doctor or other care provider to review them with you. Prescriptions Printed Refills  
 oxyCODONE IR (ROXICODONE) 20 mg immediate release tablet 0 Starting on: 6/3/2017 Sig: Take 1 Tab by mouth four (4) times daily as needed for up to 30 days. Max Daily Amount: 80 mg. Indications: Pain Class: Print Route: Oral  
 oxyCODONE IR (ROXICODONE) 20 mg immediate release tablet 0 Starting on: 7/2/2017 Sig: Take 1 Tab by mouth four (4) times daily as needed for up to 30 days. Max Daily Amount: 80 mg. Indications: Pain Class: Print Route: Oral  
 morphine CR (MS CONTIN) 15 mg CR tablet 0 Starting on: 5/29/2017 Sig: Take 1 Tab by mouth two (2) times a day for 30 days. Max Daily Amount: 30 mg. Indications: Chronic Pain, Severe Pain Class: Print  Route: Oral  
 morphine CR (MS CONTIN) 15 mg CR tablet 0 Starting on: 2017 Sig: Take 1 Tab by mouth two (2) times a day for 30 days. Max Daily Amount: 30 mg. For chronic pain due to fill 16  Indications: Chronic Pain, Severe Pain Class: Print Route: Oral  
  
Prescriptions Sent to Pharmacy Refills  
 naloxone 4 mg/actuation spry 0 Si mg by Nasal route as needed. For emergency use only  Indications: OPIATE-INDUCED RESPIRATORY DEPRESSION Class: Normal  
 Pharmacy: [address] [place] [address] #: [phone] Route: Nasal  
  
Follow-up Instructions Return in about 2 months (around 2017). Patient Instructions 1. Continue current plan with no evidence of addiction or diversion. Stable on current medication without adverse events. 2. Refill Oxycodone 20 mg up to 4 times daily as needed. 3. Refill morphine ER 15 mg every 12 hours. 4. Continue Zanaflex 4 mg 2 times daily as needed. 5. Schedule repeat cervical C7 - T1 interlaminar injection with  Dr. Mary Jo Hatfield 6. Add naloxone 4 mg nasal spray for opioid induced respiratory depression emergency only. Extensive counseling was provided regarding this medication. Instructions were given to take home 7. Discussed risks of addiction, dependency, and opioid induced hyperalgesia. 8. Return to clinic in 2 months with Dr. Jackelyn Guerrier or MM Introducing [facility]! [facility] introduces [employer] patient portal. Now you can access parts of your medical record, email your doctor's office, and request medication refills online. 1. In your internet browser, go to [email or web address] 2. Click on the First Time User? Click Here link in the Sign In box. You will see the New Member Sign Up page. 3. Enter your [employer] Access Code exactly as it appears below. You will not need to use this code after youve completed the sign-up process.  If you do not sign up before the expiration date, you must request a new code. · [employer] Access Code: [email or web address] Expires: 5/31/2017  2:45 PM 
 
4. Enter the last four digits of your Social Security Number (xxxx) and Date of Birth (mm/dd/yyyy) as indicated and click Submit. You will be taken to the next sign-up page. 5. Create a [employer] ID. This will be your [employer] login ID and cannot be changed, so think of one that is secure and easy to remember. 6. Create a [employer] password. You can change your password at any time. 7. Enter your Password Reset Question and Answer. This can be used at a later time if you forget your password. 8. Enter your e-mail address. You will receive e-mail notification when new information is available in [address] 9. Click Sign Up. You can now view and download portions of your medical record. 10. Click the Download Summary menu link to download a portable copy of your medical information. If you have questions, please visit the Frequently Asked Questions section of the [employer] website. Remember, [employer] is NOT to be used for urgent needs. For medical emergencies, dial 911. Now available from your iPhone and Android! Please provide this summary of care documentation to your next provider. Your primary care clinician is listed as [facility]. If you have any questions after today's visit, please call [phone].

## 2017-05-26 NOTE — PROGRESS NOTES
HISTORY OF PRESENT ILLNESS  Germán Beard is a 65 y.o. female    HPI: Ms. Leticia Machado  returns today for f/u of chronic, severe pain involving the lumbar spine with radiation down the right lower leg related to underlying degenerative disc disease, and spondylosis. No h/o lumbar surgery. Epidural injections and PT with no benefit. This was performed continues unchanged since last visit. She was complaining of worsening headaches last visit. Morphine has worked well for her in the past regarding her headaches. She was started on morphine ER 15 mg every 12 hours which has been working well for her headaches. She does report the morphine does nothing for her back pain. She continues with oxycodone which was recently increased from 10 mg up to 20 mg.  I have strongly encouraged her to use this medication on an as-needed basis. She was advised that she may use half to 1 tablet as needed. She was counseled about naloxone last visit but never received a prescription this prescription will be sent to her pharmacy today. Naman Melissa she reports that her fiancé recently passed away. She says that she has been coping well and is currently following up with her psychologist.  She has received cervical epidural injection by Dr. Mary Jo Hatfield in the past with good improvement. She would like to reschedule a repeat injection next available. Because the patient's current regimen places him/her at increased risk for possible overdose, a prescription for naloxone nasal spray is being provided. The patient understands that this medication is only to be used in the setting of a possible overdose and that inadvertent use of this medication could precipitate overt withdrawal.    Current medication management consists of Oxycodone 15 mg QID PRN, morphine ER 15 mg every 12 hours, and tizanidine as needed. .     Medications are helping with pain control and quality of life. Her pain is 6/10 with medication and 10/10 without.   Pt describes pain as aching. Aggravating factors include standing and walking. Relieved with rest, medication, and avoiding painful activities. Current treatment is helping to improve general activity, mood, walking, sleep, enjoyment of life    She  is otherwise doing well with no other complaints today. She denies any adverse events including nausea, vomiting, dizziness, constipation, hallucinations, or seizures. Allergies   Allergen Reactions    Pcn [Penicillins] Swelling       Past Surgical History:   Procedure Laterality Date    HX OTHER SURGICAL      mass removed from breast 5-2011         Review of Systems   Constitutional: Positive for malaise/fatigue and weight loss. Negative for chills and fever. HENT: Negative for congestion and sore throat. Eyes: Negative for blurred vision and double vision. Respiratory: Positive for cough. Negative for shortness of breath. Cardiovascular: Negative for chest pain and leg swelling. Gastrointestinal: Positive for heartburn. Negative for constipation, diarrhea, nausea and vomiting. Genitourinary: Negative for hematuria. Musculoskeletal: Positive for back pain, joint pain, myalgias and neck pain. Negative for falls. Skin: Negative. Neurological: Positive for tremors, sensory change and headaches. Negative for dizziness, tingling, seizures and weakness. Endo/Heme/Allergies: Negative for environmental allergies. Does not bruise/bleed easily. Psychiatric/Behavioral: Positive for depression. Negative for substance abuse and suicidal ideas. The patient is nervous/anxious and has insomnia. Bipolar, Sees psychiatry monthly (Dr. Parris Vivar)   All other systems reviewed and are negative. Physical Exam   Constitutional: She is oriented to person, place, and time and well-developed, well-nourished, and in no distress. No distress. HENT:   Head: Normocephalic and atraumatic. Eyes: EOM are normal.   Neck: Normal range of motion. Pulmonary/Chest: Effort normal.   Musculoskeletal: Normal range of motion. Cervical back: She exhibits tenderness and pain. Lumbar back: She exhibits tenderness and pain. Neurological: She is alert and oriented to person, place, and time. Skin: Skin is warm and dry. No rash noted. She is not diaphoretic. No erythema. Psychiatric: Mood, memory, affect and judgment normal.   Nursing note and vitals reviewed. ASSESSMENT:    1. Chronic bilateral low back pain with right-sided sciatica    2. Chronic pain syndrome    3. Osteoarthritis of spine with radiculopathy, lumbar region    4. Lumbosacral neuritis    5. Lumbar stenosis    6. Osteoarthritis of spine with radiculopathy, cervical region    7. Spondylosis of cervical region without myelopathy or radiculopathy    8. Cervical spinal stenosis    9. Spondylosis of lumbar region without myelopathy or radiculopathy         [place] Prescription Monitoring Program was reviewed which does not demonstrate aberrancies and/or inconsistencies with regard to the historical prescribing of controlled medications to this patient by other providers. PLAN / Pt Instructions:  1. Continue current plan with no evidence of addiction or diversion. Stable on current medication without adverse events. 2. Refill Oxycodone 20 mg up to 4 times daily as needed. 3. Refill morphine ER 15 mg every 12 hours. 4. Continue Zanaflex 4 mg 2 times daily as needed. 5. Schedule repeat cervical C7 - T1 interlaminar injection with  Dr. Ino Rhodes  6. Add naloxone 4 mg nasal spray for opioid induced respiratory depression emergency only. Extensive counseling was provided regarding this medication. Instructions were given to take home  7. Discussed risks of addiction, dependency, and opioid induced hyperalgesia.    8. Return to clinic in 2 months with Dr. Lana Yuan or MM    Medications Ordered Today   Medications    oxyCODONE IR (ROXICODONE) 20 mg immediate release tablet     Sig: Take 1 Tab by mouth four (4) times daily as needed for up to 30 days. Max Daily Amount: 80 mg. Indications: Pain     Dispense:  120 Tab     Refill:  0    oxyCODONE IR (ROXICODONE) 20 mg immediate release tablet     Sig: Take 1 Tab by mouth four (4) times daily as needed for up to 30 days. Max Daily Amount: 80 mg. Indications: Pain     Dispense:  120 Tab     Refill:  0    morphine CR (MS CONTIN) 15 mg CR tablet     Sig: Take 1 Tab by mouth two (2) times a day for 30 days. Max Daily Amount: 30 mg. Indications: Chronic Pain, Severe Pain     Dispense:  60 Tab     Refill:  0    morphine CR (MS CONTIN) 15 mg CR tablet     Sig: Take 1 Tab by mouth two (2) times a day for 30 days. Max Daily Amount: 30 mg. For chronic pain due to fill 16  Indications: Chronic Pain, Severe Pain     Dispense:  60 Tab     Refill:  0    naloxone 4 mg/actuation spry     Si mg by Nasal route as needed. For emergency use only  Indications: OPIATE-INDUCED RESPIRATORY DEPRESSION     Dispense:  1 Package     Refill:  0       Pain medications prescribed with the objective of pain relief and improved physical and psychosocial function in this patient. Spent 25 minutes with patient today reviewing the treatment plan, goals of treatment plan, and limitations of the treatment plan, to include the potential for side effects from medications and procedures. Reji Hoffman 2017      Note: Please excuse any typographical errors. Voice recognition software was used for this note and may cause mistakes.

## 2017-07-21 ENCOUNTER — OFFICE VISIT (OUTPATIENT)
Dept: PAIN MANAGEMENT | Age: 62
End: 2017-07-21

## 2017-07-21 VITALS
OXYGEN SATURATION: 96 % | BODY MASS INDEX: 23.41 KG/M2 | RESPIRATION RATE: 20 BRPM | SYSTOLIC BLOOD PRESSURE: 108 MMHG | HEART RATE: 75 BPM | DIASTOLIC BLOOD PRESSURE: 74 MMHG | WEIGHT: 128 LBS | TEMPERATURE: 97.4 F

## 2017-07-21 DIAGNOSIS — M47.812 SPONDYLOSIS OF CERVICAL REGION WITHOUT MYELOPATHY OR RADICULOPATHY: ICD-10-CM

## 2017-07-21 DIAGNOSIS — F31.9 BIPOLAR DISORDER, UNSPECIFIED (HCC): ICD-10-CM

## 2017-07-21 DIAGNOSIS — M48.02 CERVICAL SPINAL STENOSIS: ICD-10-CM

## 2017-07-21 DIAGNOSIS — M47.816 SPONDYLOSIS OF LUMBAR REGION WITHOUT MYELOPATHY OR RADICULOPATHY: ICD-10-CM

## 2017-07-21 DIAGNOSIS — M48.061 LUMBAR STENOSIS: ICD-10-CM

## 2017-07-21 DIAGNOSIS — M54.10 RADICULITIS: ICD-10-CM

## 2017-07-21 DIAGNOSIS — G56.03 BILATERAL CARPAL TUNNEL SYNDROME: ICD-10-CM

## 2017-07-21 DIAGNOSIS — G54.4: ICD-10-CM

## 2017-07-21 DIAGNOSIS — M54.17 LUMBOSACRAL NEURITIS: ICD-10-CM

## 2017-07-21 DIAGNOSIS — M47.22 OSTEOARTHRITIS OF SPINE WITH RADICULOPATHY, CERVICAL REGION: Primary | ICD-10-CM

## 2017-07-21 DIAGNOSIS — M54.12 CERVICAL RADICULOPATHY: ICD-10-CM

## 2017-07-21 DIAGNOSIS — M54.16 LUMBAR NEURITIS: ICD-10-CM

## 2017-07-21 RX ORDER — MORPHINE SULFATE 15 MG/1
15 TABLET, FILM COATED, EXTENDED RELEASE ORAL 2 TIMES DAILY
Qty: 60 TAB | Refills: 0 | Status: SHIPPED | OUTPATIENT
Start: 2017-08-25 | End: 2017-07-21 | Stop reason: SDUPTHER

## 2017-07-21 RX ORDER — MORPHINE SULFATE 15 MG/1
15 TABLET, FILM COATED, EXTENDED RELEASE ORAL 2 TIMES DAILY
Qty: 60 TAB | Refills: 0 | Status: SHIPPED | OUTPATIENT
Start: 2017-07-27 | End: 2017-07-21 | Stop reason: SDUPTHER

## 2017-07-21 RX ORDER — OXYCODONE HYDROCHLORIDE 20 MG/1
20 TABLET ORAL
Qty: 120 TAB | Refills: 0 | Status: SHIPPED | OUTPATIENT
Start: 2017-07-31 | End: 2017-07-21 | Stop reason: SDUPTHER

## 2017-07-21 RX ORDER — MORPHINE SULFATE 15 MG/1
15 TABLET, FILM COATED, EXTENDED RELEASE ORAL 2 TIMES DAILY
Qty: 60 TAB | Refills: 0 | Status: SHIPPED | OUTPATIENT
Start: 2017-09-23 | End: 2017-10-25 | Stop reason: SDUPTHER

## 2017-07-21 RX ORDER — OXYCODONE HYDROCHLORIDE 20 MG/1
20 TABLET ORAL
Qty: 120 TAB | Refills: 0 | Status: SHIPPED | OUTPATIENT
Start: 2017-09-27 | End: 2017-10-25 | Stop reason: SDUPTHER

## 2017-07-21 RX ORDER — OXYCODONE HYDROCHLORIDE 20 MG/1
20 TABLET ORAL
Qty: 120 TAB | Refills: 0 | Status: SHIPPED | OUTPATIENT
Start: 2017-08-29 | End: 2017-07-21 | Stop reason: SDUPTHER

## 2017-07-21 NOTE — MR AVS SNAPSHOT
Visit Information Date & Time Provider Department Dept. Phone Encounter #  
 7/21/2017  2:20 PM Cari Vásquez PA-C [address] [facility] [phone] Follow-up Instructions Return in about 3 months (around 10/21/2017). Upcoming Health Maintenance Date Due Hepatitis C Screening 1955 Pneumococcal 19-64 Medium Risk (1 of 1 - PPSV23) 5/4/1975 DTaP/Tdap/Td series (1 - Tdap) 5/4/1977 PAP AKA CERVICAL CYTOLOGY 5/4/1977 BREAST CANCER SCRN MAMMOGRAM 5/4/2006 FOBT Q 1 YEAR AGE 50-75 5/4/2006 ZOSTER VACCINE AGE 60> 3/4/2016 INFLUENZA AGE 9 TO ADULT 8/1/2017 Allergies as of 7/21/2017  Review Complete On: 7/21/2017 By: Cyrus Nathan LPN Severity Noted Reaction Type Reactions Pcn [Penicillins]  07/09/2013    Swelling Current Immunizations  Never Reviewed No immunizations on file. Not reviewed this visit You Were Diagnosed With   
  
 Codes Comments Spondylosis of lumbar region without myelopathy or radiculopathy    -  Primary ICD-10-CM: M47.816 ICD-9-CM: 721.3 Radiculitis     ICD-10-CM: M54.10 ICD-9-CM: 729.2 Lumbosacral neuritis     ICD-10-CM: M54.17 ICD-9-CM: 724.4 Lumbosacral root lesions, not elsewhere classified     ICD-10-CM: G54.4 ICD-9-CM: 353.4 Lumbar neuritis     ICD-10-CM: M54.16 
ICD-9-CM: 724.4 Bilateral carpal tunnel syndrome     ICD-10-CM: G56.03 
ICD-9-CM: 354.0 Cervical radiculopathy     ICD-10-CM: M54.12 
ICD-9-CM: 723.4 Osteoarthritis of spine with radiculopathy, cervical region     ICD-10-CM: M47.22 
ICD-9-CM: 721.0 Bipolar disorder, unspecified ([facility]     ICD-10-CM: F31.9 ICD-9-CM: 296.80 Lumbar stenosis     ICD-10-CM: M48.06 
ICD-9-CM: 724.02 Spondylosis of cervical region without myelopathy or radiculopathy     ICD-10-CM: M47.812 ICD-9-CM: 721.0 Cervical spinal stenosis     ICD-10-CM: M48.02 
ICD-9-CM: 723.0 Vitals BP Pulse Temp Resp Weight(growth percentile) SpO2  
 108/74 (BP 1 Location: Right arm) 75 97.4 °F (36.3 °C) 20 128 lb (58.1 kg) 96% BMI OB Status Smoking Status 23.41 kg/m2 Postmenopausal Current Every Day Smoker BMI and BSA Data Body Mass Index Body Surface Area  
 23.41 kg/m 2 1.59 m 2 Preferred Pharmacy Pharmacy Name Phone [address] [facility], [place] [address] [phone] Your Updated Medication List  
  
   
This list is accurate as of: 7/21/17  3:14 PM.  Always use your most recent med list.  
  
  
  
  
 aspirin delayed-release 81 mg tablet Take  by mouth daily. buPROPion 100 mg tablet Commonly known as:  [facility] Take 300 mg by mouth. Olivia Aw Use as directed to assist with ambulation DULoxetine 20 mg capsule Commonly known as:  CYMBALTA Take 20 mg by mouth daily. ergocalciferol 50,000 unit capsule Commonly known as:  ERGOCALCIFEROL Take 50,000 Units by mouth.  
  
 gabapentin 300 mg capsule Commonly known as:  NEURONTIN Take 1 capsule once in the evening for one week, then 2 times a day thereafter. #53 LATUDA 20 mg Tab tablet Generic drug:  lurasidone Take  by mouth.  
  
 morphine CR 15 mg CR tablet Commonly known as:  MS CONTIN Take 1 Tab by mouth two (2) times a day for 30 days. Max Daily Amount: 30 mg. For chronic pain  Indications: Chronic Pain, Severe Pain Start taking on:  9/23/2017  
  
 naloxone 4 mg/actuation Spry 4 mg by Nasal route as needed. For emergency use only  Indications: OPIATE-INDUCED RESPIRATORY DEPRESSION PriLOSEC 20 mg capsule Generic drug:  omeprazole Take 20 mg by mouth daily. primidone 50 mg tablet Commonly known as: MYSOLINE Take 50 mg by mouth two (2) times a day. pyridoxine (vitamin B6) 100 mg tablet Commonly known as:  VITAMIN B-6 Take 100 mg by mouth daily. raNITIdine 75 mg tablet Commonly known as:  ZANTAC Take 75 mg by mouth two (2) times a day. * ROXICODONE 15 mg immediate release tablet Generic drug:  oxyCODONE IR Take 15 mg by mouth four (4) times daily. * oxyCODONE IR 20 mg immediate release tablet Commonly known as:  Santo Bers Take 1 Tab by mouth four (4) times daily as needed for up to 30 days. Max Daily Amount: 80 mg. Indications: Pain Start taking on:  9/27/2017  
  
 simvastatin 20 mg tablet Commonly known as:  ZOCOR Take  by mouth nightly. sucralfate 1 gram tablet Commonly known as:  Curvin Hilt Take 1 g by mouth four (4) times daily. VALIUM 5 mg tablet Generic drug:  diazePAM  
Take 5 mg by mouth daily as needed. * Notice: This list has 2 medication(s) that are the same as other medications prescribed for you. Read the directions carefully, and ask your doctor or other care provider to review them with you. Prescriptions Printed Refills  
 oxyCODONE IR (ROXICODONE) 20 mg immediate release tablet 0 Starting on: 9/27/2017 Sig: Take 1 Tab by mouth four (4) times daily as needed for up to 30 days. Max Daily Amount: 80 mg. Indications: Pain Class: Print Route: Oral  
 morphine CR (MS CONTIN) 15 mg CR tablet 0 Starting on: 9/23/2017 Sig: Take 1 Tab by mouth two (2) times a day for 30 days. Max Daily Amount: 30 mg. For chronic pain  Indications: Chronic Pain, Severe Pain Class: Print Route: Oral  
  
Follow-up Instructions Return in about 3 months (around 10/21/2017). Patient Instructions Plan: 
Continue same medications as prescribed for chronic pain Follow up in 3 months or sooner if needed Regular exercise and attention to emotional health and diet remain the most effective ways to treat chronic pain of all kinds You may contact me with questions or concerns through [address] Introducing [address]! Nery Mohamud introduces [employer] patient portal. Now you can access parts of your medical record, email your doctor's office, and request medication refills online. 1. In your internet browser, go to [email or web address] 2. Click on the First Time User? Click Here link in the Sign In box. You will see the New Member Sign Up page. 3. Enter your [employer] Access Code exactly as it appears below. You will not need to use this code after youve completed the sign-up process. If you do not sign up before the expiration date, you must request a new code. · [employer] Access Code: [email or web address] Expires: 10/19/2017  3:13 PM 
 
4. Enter the last four digits of your Social Security Number (xxxx) and Date of Birth (mm/dd/yyyy) as indicated and click Submit. You will be taken to the next sign-up page. 5. Create a [employer] ID. This will be your [employer] login ID and cannot be changed, so think of one that is secure and easy to remember. 6. Create a [employer] password. You can change your password at any time. 7. Enter your Password Reset Question and Answer. This can be used at a later time if you forget your password. 8. Enter your e-mail address. You will receive e-mail notification when new information is available in [address] 9. Click Sign Up. You can now view and download portions of your medical record. 10. Click the Download Summary menu link to download a portable copy of your medical information. If you have questions, please visit the Frequently Asked Questions section of the [employer] website. Remember, [employer] is NOT to be used for urgent needs. For medical emergencies, dial 911. Now available from your iPhone and Android! Please provide this summary of care documentation to your next provider. Your primary care clinician is listed as Devora Rivas. If you have any questions after today's visit, please call [phone].

## 2017-07-21 NOTE — PROGRESS NOTES
HISTORY OF PRESENT ILLNESS  Kirill Ko is a 65 y.o. female. she returns for follow-up of chronic, severe pain involving the cervical spine related to underlying cervical stenosis, degenerative disc disease, and radiculopathy as well as pain involving the lumbar spine with radiation down the right lower extremity, anteriorly posteriorly, related to degenerative disc disease, spondylosis, and radiculopathy. She also suffers from bilateral median nerve entrapment carpal tunnel. She is greiving the loss of her fiancee, with whom she lived for over ten years. He passed away after a five month jerry with small cell lung CA. We discussed this at length. She denies any worsening of depressive symptoms and reports that she has a strong social support network. She has recently moved to [place] from [facility], and feels that this is \"new start\" for her. She denies any suicidal ideation or racing thoughts. Neck pain remains her predominate pain generator. She reports that the periodic use of cervical ESIs are very helpful for neck pain and arm pain, and she is anxious to have this procedure scheduled shortly. This procedure in the past offered more than 50% relief for more than three months. Pt reports average of 5-6/10 pain scale most days. She denies any new symptoms. Medications continue to work modestly well for pain control overall. Kirill Ko is tolerating medications well, with no untoward side effects noted. She is able to stay more active with less discomfort with these current doses. The patient reports an average of 30-40% relief with this regimen. Most recent UDS and  were consistent with prescribed medications. Pill counts are appropriate.  She is informed of side effects, risks, and benefits of this regimen, and emphasizes that she derives a significant improvement in functionality and quality of life, and notes that non-opioid medications and therapies in the past have not offered significant benefit. She denies new or worsening insomnia or constipation issues. She denies any falls, injuries, or hospitalizations since the last visit. HPI--see above    ROS   Constitutional: Positive for malaise/fatigue and weight loss. Negative for chills and fever. HENT: Negative for congestion and sore throat. Eyes: Negative for blurred vision (h/o hypoglycemia which will cause some blurry vision) and double vision. Respiratory: Positive for cough. Negative for shortness of breath. COPD  Bronchitis (using inhaler and on antibiotics)  Cigarette smoker   Cardiovascular: Negative for chest pain and leg swelling. Gastrointestinal: Positive for heartburn. Negative for constipation, diarrhea, nausea and vomiting. Genitourinary: Negative for hematuria. Musculoskeletal: Positive for back pain, joint pain (left shoulder), myalgias and neck pain. Negative for falls. Skin: Negative. Neurological: Positive for tremors (primidone manages), sensory change (right arm) and headaches (\"constantly\"-h/o migraines (had recent brain MRI(). Negative for dizziness, tingling, seizures and weakness. Endo/Heme/Allergies: Negative for environmental allergies. Does not bruise/bleed easily. Psychiatric/Behavioral: Positive for depression. Negative for substance abuse and suicidal ideas. The patient is nervous/anxious and has insomnia. Bipolar  Sees psychiatry monthly (Dr. Casey Rodas)   Physical Exam  Constitutional: She is oriented to person, place, and time. She appears well-developed and well-nourished. No distress. HENT:   Head: Normocephalic. Eyes: Conjunctivae and EOM are normal. Pupils are equal, round, and reactive to light.   glasses   Neck: Normal range of motion. No thyromegaly present. Painful ROM   Pulmonary/Chest: Effort normal. No respiratory distress. Musculoskeletal: She exhibits tenderness (neck/lumbar). She exhibits no edema.         Cervical back: She exhibits decreased range of motion, tenderness, pain and spasm. Lumbar back: She exhibits decreased range of motion, tenderness, pain and spasm. Neurological: She is alert and oriented to person, place, and time. No cranial nerve deficit (grossly intact). Gait (antalgic) abnormal.   Psychiatric: She has a normal mood and affect. Her behavior is normal. Judgment and thought content normal.   ASSESSMENT and PLAN  Encounter Diagnoses     ICD-10-CM ICD-9-CM   1. Osteoarthritis of spine with radiculopathy, cervical region M47.22 721.0   2. Radiculitis M54.10 729.2   3. Lumbosacral neuritis M54.17 724.4   4. Lumbosacral root lesions, not elsewhere classified G54.4 353.4   5. Lumbar neuritis M54.16 724.4   6. Bilateral carpal tunnel syndrome G56.03 354.0   7. Cervical radiculopathy M54.12 723.4   8. Bipolar disorder, unspecified ([facility] F31.9 296.80   9. Lumbar stenosis M48.06 724.02   10. Spondylosis of cervical region without myelopathy or radiculopathy M47.812 721.0   11.  Cervical spinal stenosis M48.02 723.0   12. Spondylosis of lumbar region without myelopathy or radiculopathy M47.816 721.3       Plan:  Continue same medications as prescribed for chronic pain  Repeat cervical IVA with Dr. Davy Vang soon  Follow up in 3 months or sooner if needed  Regular exercise and attention to emotional health and diet remain the most effective ways to treat chronic pain of all kinds  You may contact me with questions or concerns through [employer]

## 2017-07-21 NOTE — PROGRESS NOTES
Nursing Notes    Patient presents to the office today in follow-up. Patient rates her pain at 7/10 on the numerical pain scale. Reviewed medications with counts as follows:    Rx Date filled Qty Dispensed Pill Count Last Dose Short   Morphine sulfate 15 mg 06/28/17 60 16 This am  no   Oxycodone 20 mg 07/02/17 120 42 today no         Comments: Patient is here for pain management appt today she states pain level is 7 out of 10  She visited [facility] for a sinus infection she received a Z paxk ABX    POC UDS was not performed in office today    Any new labs or imaging since last appointment? NO    Have you been to an emergency room (ER) or urgent care clinic since your last visit? YES        Sinus infection patient was given ABX    Have you been hospitalized since your last visit? NO     If yes, where, when, and reason for visit? Have you seen or consulted any other health care providers outside of [facility]  since your last visit? YES urgent Care  If yes, where, when, and reason for visit? Ms. Kristin Burr has a reminder for a \"due or due soon\" health maintenance. I have asked that she contact her primary care provider for follow-up on this health maintenance.

## 2017-08-28 ENCOUNTER — TELEPHONE (OUTPATIENT)
Dept: PAIN MANAGEMENT | Age: 62
End: 2017-08-28

## 2017-08-28 NOTE — TELEPHONE ENCOUNTER
08/28/2017 at 04:44 pm I called and I LVM for patient to call back. This is regarding appt for cervical IVA that patient is requesting. Amparo Loose Amparo Loose Amparo Loose [place] Miranda

## 2017-10-09 ENCOUNTER — TELEPHONE (OUTPATIENT)
Dept: PAIN MANAGEMENT | Age: 62
End: 2017-10-09

## 2017-10-09 NOTE — TELEPHONE ENCOUNTER
Called pt re 10/17 appt cancelled d/t Dr Torres May leaving the practice. Pt asked what to do. Explained she can ask her pcp or insurance for a referral or we can send her a list of neurologists/pain mgmt providers. Asked pt if she will need a script for her pain meds. Pt took the nurses' line number -said she will call if she needs meds.  Pt understood

## 2017-10-25 ENCOUNTER — OFFICE VISIT (OUTPATIENT)
Dept: PAIN MANAGEMENT | Age: 62
End: 2017-10-25

## 2017-10-25 VITALS
TEMPERATURE: 97.2 F | BODY MASS INDEX: 23.55 KG/M2 | SYSTOLIC BLOOD PRESSURE: 135 MMHG | RESPIRATION RATE: 18 BRPM | WEIGHT: 128 LBS | DIASTOLIC BLOOD PRESSURE: 86 MMHG | HEIGHT: 62 IN | HEART RATE: 80 BPM

## 2017-10-25 DIAGNOSIS — M47.812 SPONDYLOSIS OF CERVICAL REGION WITHOUT MYELOPATHY OR RADICULOPATHY: ICD-10-CM

## 2017-10-25 DIAGNOSIS — M48.02 CERVICAL SPINAL STENOSIS: ICD-10-CM

## 2017-10-25 DIAGNOSIS — M47.26 OSTEOARTHRITIS OF SPINE WITH RADICULOPATHY, LUMBAR REGION: ICD-10-CM

## 2017-10-25 DIAGNOSIS — G89.4 CHRONIC PAIN SYNDROME: ICD-10-CM

## 2017-10-25 DIAGNOSIS — M54.17 LUMBOSACRAL NEURITIS: ICD-10-CM

## 2017-10-25 DIAGNOSIS — Z79.899 ENCOUNTER FOR LONG-TERM (CURRENT) USE OF HIGH-RISK MEDICATION: Primary | ICD-10-CM

## 2017-10-25 DIAGNOSIS — M54.10 RADICULITIS: ICD-10-CM

## 2017-10-25 DIAGNOSIS — M54.41 CHRONIC BILATERAL LOW BACK PAIN WITH RIGHT-SIDED SCIATICA: ICD-10-CM

## 2017-10-25 DIAGNOSIS — M47.816 SPONDYLOSIS OF LUMBAR REGION WITHOUT MYELOPATHY OR RADICULOPATHY: ICD-10-CM

## 2017-10-25 DIAGNOSIS — G89.29 CHRONIC BILATERAL LOW BACK PAIN WITH RIGHT-SIDED SCIATICA: ICD-10-CM

## 2017-10-25 LAB
ALCOHOL UR POC: NORMAL
AMPHETAMINES UR POC: NORMAL
BARBITURATES UR POC: NORMAL
BENZODIAZEPINES UR POC: NORMAL
BUPRENORPHINE UR POC: NORMAL
CANNABINOIDS UR POC: NORMAL
CARISOPRODOL UR POC: NORMAL
COCAINE UR POC: NORMAL
FENTANYL UR POC: NORMAL
MDMA/ECSTASY UR POC: NORMAL
METHADONE UR POC: NORMAL
METHAMPHETAMINE UR POC: NORMAL
METHYLPHENIDATE UR POC: NORMAL
OPIATES UR POC: NORMAL
OXYCODONE UR POC: NORMAL
PHENCYCLIDINE UR POC: NORMAL
PROPOXYPHENE UR POC: NORMAL
TRAMADOL UR POC: NORMAL
TRICYCLICS UR POC: NORMAL

## 2017-10-25 RX ORDER — MORPHINE SULFATE 30 MG/1
30 TABLET, FILM COATED, EXTENDED RELEASE ORAL EVERY 12 HOURS
Qty: 60 TAB | Refills: 0 | Status: SHIPPED | OUTPATIENT
Start: 2017-10-25 | End: 2017-11-20 | Stop reason: SDUPTHER

## 2017-10-25 RX ORDER — OXYCODONE HYDROCHLORIDE 15 MG/1
15 TABLET ORAL
Qty: 120 TAB | Refills: 0 | Status: SHIPPED | OUTPATIENT
Start: 2017-10-25 | End: 2017-11-20 | Stop reason: SDUPTHER

## 2017-10-25 RX ORDER — TIZANIDINE 4 MG/1
4 TABLET ORAL
Qty: 60 TAB | Refills: 3 | Status: SHIPPED | OUTPATIENT
Start: 2017-10-25 | End: 2017-11-24

## 2017-10-25 NOTE — ACP (ADVANCE CARE PLANNING)
Patient do not have an advance care plan and is not interested at this time. Patient verbalized understanding.

## 2017-10-25 NOTE — PROGRESS NOTES
HISTORY OF PRESENT ILLNESS  Shubham To is a 65 y.o. female    HPI: Ms. Leann Vazquez  returns today for f/u of chronic, severe pain involving the lumbar spine with radiation down the right lower leg related to underlying degenerative disc disease, and spondylosis. No h/o lumbar surgery. Lumbar Epidural injections and PT with no benefit. She has received cervical epidural injection by Dr. Addy Ware in the past with good improvemen    Ms. Leann Vazquez continues unchanged since last visit. She continues to complain that her morphine has not been very effective. Recently her oxycodone was adjusted up to 20 mg.  I continue to encourage her to adjust this back down and we may adjust her morphine if needed. We had a very lengthy conversation again about long-acting versus short acting medication. Currently she seems to be relying more on her  oxycodone. She has agreed for me to adjust her morphine ER up to 30 mg every 12 hours and we will back her oxycodone down to 15 mg up to 4 times daily. I have strongly encouraged her to use her oxycodone on an as-needed basis only. She verbally agrees. I am hopeful with the adjustment of morphine that we may be able to adjust her oxycodone down further next visit. I will have her follow-up in 1 month for further evaluation and recommendation. We did have a very lengthy conversation about Dr. Aime kaminski from our practice. She has been pretty upset about Dr. Adelfo Bueno and has been looking for another provider. She says that she currently has an appointment with a provider closer to her home. She will need transfer records. She was asked to call and cancel her appointments if she does transition her care for next month. Current medication management consists of Oxycodone 15 mg QID PRN, morphine ER 15 mg every 12 hours, and tizanidine as needed. .     Medications are helping with pain control and quality of life.  Her pain is 6/10 with medication and 10/10 without. Pt describes pain as aching. Aggravating factors include standing and walking. Relieved with rest, medication, and avoiding painful activities. Current treatment is helping to improve general activity, mood, walking, sleep, enjoyment of life    She  is otherwise doing well with no other complaints today. She denies any adverse events including nausea, vomiting, dizziness, constipation, hallucinations, or seizures. Because the patient's current regimen places him/her at increased risk for possible overdose, a prescription for naloxone nasal spray has been provided. The patient understands that this medication is only to be used in the setting of a possible overdose and that inadvertent use of this medication could precipitate overt withdrawal.       Allergies   Allergen Reactions    Pcn [Penicillins] Swelling       Past Surgical History:   Procedure Laterality Date    HX OTHER SURGICAL      mass removed from breast 5-2011         Review of Systems   Constitutional: Positive for malaise/fatigue and weight loss. Negative for chills and fever. HENT: Negative for congestion and sore throat. Eyes: Negative for blurred vision and double vision. Respiratory: Positive for cough. Negative for shortness of breath. Cardiovascular: Negative for chest pain and leg swelling. Gastrointestinal: Positive for heartburn. Negative for constipation, diarrhea, nausea and vomiting. Genitourinary: Negative for hematuria. Musculoskeletal: Positive for back pain, joint pain, myalgias and neck pain. Negative for falls. Skin: Negative. Neurological: Positive for tremors, sensory change and headaches. Negative for dizziness, tingling, seizures and weakness. Endo/Heme/Allergies: Negative for environmental allergies. Does not bruise/bleed easily. Psychiatric/Behavioral: Positive for depression. Negative for substance abuse and suicidal ideas. The patient is nervous/anxious and has insomnia. Bipolar, Sees psychiatry monthly (Dr. Fede Frias)   All other systems reviewed and are negative. Physical Exam   Constitutional: She is oriented to person, place, and time and well-developed, well-nourished, and in no distress. No distress. HENT:   Head: Normocephalic and atraumatic. Eyes: EOM are normal.   Neck: Normal range of motion. Pulmonary/Chest: Effort normal.   Musculoskeletal: Normal range of motion. Cervical back: She exhibits tenderness and pain. Lumbar back: She exhibits tenderness and pain. Neurological: She is alert and oriented to person, place, and time. Skin: Skin is warm and dry. No rash noted. She is not diaphoretic. No erythema. Psychiatric: Mood, memory, affect and judgment normal.   Nursing note and vitals reviewed. ASSESSMENT:    1. Encounter for long-term (current) use of high-risk medication    2. Chronic bilateral low back pain with right-sided sciatica    3. Chronic pain syndrome    4. Radiculitis    5. Osteoarthritis of spine with radiculopathy, lumbar region    6. Lumbosacral neuritis    7. Spondylosis of cervical region without myelopathy or radiculopathy    8. Cervical spinal stenosis    9. Spondylosis of lumbar region without myelopathy or radiculopathy         [place] Prescription Monitoring Program was reviewed which does not demonstrate aberrancies and/or inconsistencies with regard to the historical prescribing of controlled medications to this patient by other providers. PLAN / Pt Instructions:  1. Continue current plan with no evidence of addiction or diversion. Stable on current medication without adverse events. 2. Refill and adjust oxycodone 20 mg down to 15 mg up to 4 times daily on an as-needed basis only. 3. Refill and adjust morphine ER 50 mg up to 30 mg every 12 hours  4. Refill Zanaflex 4 mg 2 times daily as needed. 5. Schedule repeat cervical C7 - T1 interlaminar injection with  Dr. Gildardo Hawthorne  6.  Naloxone 4 mg nasal spray for opioid induced respiratory depression emergency only. 7. Discussed risks of addiction, dependency, and opioid induced hyperalgesia. 8. Return to clinic in 1 month     Medications Ordered Today   Medications    oxyCODONE IR (OXY-IR) 15 mg immediate release tablet     Sig: Take 1 Tab by mouth four (4) times daily as needed for up to 30 days. Max Daily Amount: 60 mg. Indications: Pain     Dispense:  120 Tab     Refill:  0    morphine CR (MS CONTIN) 30 mg CR tablet     Sig: Take 1 Tab by mouth every twelve (12) hours for 30 days. Max Daily Amount: 60 mg. For chronic pain  Indications: Chronic Pain, Severe Pain     Dispense:  60 Tab     Refill:  0    tiZANidine (ZANAFLEX) 4 mg tablet     Sig: Take 1 Tab by mouth two (2) times daily as needed for up to 30 days. Dispense:  60 Tab     Refill:  3       Pain medications prescribed with the objective of pain relief and improved physical and psychosocial function in this patient. Spent 25 minutes with patient today reviewing the treatment plan, goals of treatment plan, and limitations of the treatment plan, to include the potential for side effects from medications and procedures. Reji Gayle 10/25/2017      Note: Please excuse any typographical errors. Voice recognition software was used for this note and may cause mistakes.

## 2017-10-25 NOTE — MR AVS SNAPSHOT
Visit Information Date & Time Provider Department Dept. Phone Encounter #  
 10/25/2017  8:20 AM Emmie Amador, [address] [facility] [phone] [id number] Follow-up Instructions Return in about 1 month (around 11/25/2017). Upcoming Health Maintenance Date Due Hepatitis C Screening 1955 Pneumococcal 19-64 Medium Risk (1 of 1 - PPSV23) 5/4/1975 DTaP/Tdap/Td series (1 - Tdap) 5/4/1977 PAP AKA CERVICAL CYTOLOGY 5/4/1977 BREAST CANCER SCRN MAMMOGRAM 5/4/2006 FOBT Q 1 YEAR AGE 50-75 5/4/2006 ZOSTER VACCINE AGE 60> 3/4/2016 INFLUENZA AGE 9 TO ADULT 8/1/2017 Allergies as of 10/25/2017  Review Complete On: 10/25/2017 By: YANG Parham Severity Noted Reaction Type Reactions Pcn [Penicillins]  07/09/2013    Swelling Current Immunizations  Never Reviewed No immunizations on file. Not reviewed this visit You Were Diagnosed With   
  
 Codes Comments Encounter for long-term (current) use of high-risk medication    -  Primary ICD-10-CM: P66.925 ICD-9-CM: V58.69 Chronic bilateral low back pain with right-sided sciatica     ICD-10-CM: M54.41, G89.29 ICD-9-CM: 724.2, 724.3, 338.29 Chronic pain syndrome     ICD-10-CM: G89.4 ICD-9-CM: 338.4 Radiculitis     ICD-10-CM: M54.10 ICD-9-CM: 729.2 Osteoarthritis of spine with radiculopathy, lumbar region     ICD-10-CM: M47.26 
ICD-9-CM: 721.3 Lumbosacral neuritis     ICD-10-CM: M54.17 ICD-9-CM: 724.4 Spondylosis of cervical region without myelopathy or radiculopathy     ICD-10-CM: M47.812 ICD-9-CM: 721.0 Cervical spinal stenosis     ICD-10-CM: M48.02 
ICD-9-CM: 723.0 Spondylosis of lumbar region without myelopathy or radiculopathy     ICD-10-CM: M47.816 ICD-9-CM: 721.3 Vitals BP Pulse Temp Resp Height(growth percentile) Weight(growth percentile) 135/86 (BP 1 Location: Right arm, BP Patient Position: Sitting) 80 97.2 °F (36.2 °C) (Oral) 18 5' 2\" (1.575 m) 128 lb (58.1 kg) BMI OB Status Smoking Status 23.41 kg/m2 Postmenopausal Current Every Day Smoker BMI and BSA Data Body Mass Index Body Surface Area  
 23.41 kg/m 2 1.59 m 2 Preferred Pharmacy Pharmacy Name Phone [address] [place] [address] [phone] Your Updated Medication List  
  
   
This list is accurate as of: 10/25/17  9:08 AM.  Always use your most recent med list.  
  
  
  
  
 aspirin delayed-release 81 mg tablet Take  by mouth daily. buPROPion 100 mg tablet Commonly known as:  [facility] Take 300 mg by mouth. Abel Ureña Use as directed to assist with ambulation DULoxetine 20 mg capsule Commonly known as:  CYMBALTA Take 20 mg by mouth daily. ergocalciferol 50,000 unit capsule Commonly known as:  ERGOCALCIFEROL Take 50,000 Units by mouth.  
  
 gabapentin 300 mg capsule Commonly known as:  NEURONTIN Take 1 capsule once in the evening for one week, then 2 times a day thereafter. #53 LATUDA 20 mg Tab tablet Generic drug:  lurasidone Take  by mouth.  
  
 morphine CR 30 mg CR tablet Commonly known as:  MS CONTIN Take 1 Tab by mouth every twelve (12) hours for 30 days. Max Daily Amount: 60 mg. For chronic pain  Indications: Chronic Pain, Severe Pain  
  
 naloxone 4 mg/actuation nasal spray Commonly known as:  NARCAN  
4 mg by Nasal route as needed. For emergency use only  Indications: OPIATE-INDUCED RESPIRATORY DEPRESSION PriLOSEC 20 mg capsule Generic drug:  omeprazole Take 20 mg by mouth daily. primidone 50 mg tablet Commonly known as: MYSOLINE Take 50 mg by mouth two (2) times a day. pyridoxine (vitamin B6) 100 mg tablet Commonly known as:  VITAMIN B-6 Take 100 mg by mouth daily. raNITIdine 75 mg tablet Commonly known as:  ZANTAC Take 75 mg by mouth two (2) times a day. * ROXICODONE 15 mg immediate release tablet Generic drug:  oxyCODONE IR Take 15 mg by mouth four (4) times daily. * oxyCODONE IR 15 mg immediate release tablet Commonly known as:  OXY-IR Take 1 Tab by mouth four (4) times daily as needed for up to 30 days. Max Daily Amount: 60 mg. Indications: Pain  
  
 simvastatin 20 mg tablet Commonly known as:  ZOCOR Take  by mouth nightly. sucralfate 1 gram tablet Commonly known as:  Charm Oh Take 1 g by mouth four (4) times daily. tiZANidine 4 mg tablet Commonly known as:  Angelique Bossz Take 1 Tab by mouth two (2) times daily as needed for up to 30 days. VALIUM 5 mg tablet Generic drug:  diazePAM  
Take 5 mg by mouth daily as needed. * Notice: This list has 2 medication(s) that are the same as other medications prescribed for you. Read the directions carefully, and ask your doctor or other care provider to review them with you. Prescriptions Printed Refills  
 oxyCODONE IR (OXY-IR) 15 mg immediate release tablet 0 Sig: Take 1 Tab by mouth four (4) times daily as needed for up to 30 days. Max Daily Amount: 60 mg. Indications: Pain Class: Print Route: Oral  
 morphine CR (MS CONTIN) 30 mg CR tablet 0 Sig: Take 1 Tab by mouth every twelve (12) hours for 30 days. Max Daily Amount: 60 mg. For chronic pain  Indications: Chronic Pain, Severe Pain Class: Print Route: Oral  
  
Prescriptions Sent to Pharmacy Refills  
 tiZANidine (ZANAFLEX) 4 mg tablet 3 Sig: Take 1 Tab by mouth two (2) times daily as needed for up to 30 days. Class: Normal  
 Pharmacy: [address] [place] [address] #: [phone] Route: Oral  
  
We Performed the Following AMB POC DRUG SCREEN () [ \[facility] [place] [facility]\""] DRUG SCREEN [RTG47994 Custom] Follow-up Instructions Return in about 1 month (around 11/25/2017). Patient Instructions 1. Continue current plan with no evidence of addiction or diversion. Stable on current medication without adverse events. 2. Refill and adjust oxycodone 20 mg down to 15 mg up to 4 times daily on an as-needed basis only. 3. Refill and adjust morphine ER 50 mg up to 30 mg every 12 hours 4. Refill Zanaflex 4 mg 2 times daily as needed. 5. Schedule repeat cervical C7 - T1 interlaminar injection with  Dr. Melony Gaviria 6. Naloxone 4 mg nasal spray for opioid induced respiratory depression emergency only. 7. Discussed risks of addiction, dependency, and opioid induced hyperalgesia. 8. Return to clinic in 1 month Introducing [facility]! Maximino Astudillo introduces [facility] patient portal. Now you can access parts of your medical record, email your doctor's office, and request medication refills online. 1. In your internet browser, go to [email or web address] 2. Click on the First Time User? Click Here link in the Sign In box. You will see the New Member Sign Up page. 3. Enter your [facility] Access Code exactly as it appears below. You will not need to use this code after youve completed the sign-up process. If you do not sign up before the expiration date, you must request a new code. · [facility] Access Code: [email or web address] Expires: 1/23/2018  9:08 AM 
 
4. Enter the last four digits of your Social Security Number (xxxx) and Date of Birth (mm/dd/yyyy) as indicated and click Submit. You will be taken to the next sign-up page. 5. Create a [facility] ID. This will be your [facility] login ID and cannot be changed, so think of one that is secure and easy to remember. 6. Create a [facility] password. You can change your password at any time. 7. Enter your Password Reset Question and Answer. This can be used at a later time if you forget your password. 8. Enter your e-mail address. You will receive e-mail notification when new information is available in [address] 9. Click Sign Up. You can now view and download portions of your medical record. 10. Click the Download Summary menu link to download a portable copy of your medical information. If you have questions, please visit the Frequently Asked Questions section of the [employer] website. Remember, [facility] is NOT to be used for urgent needs. For medical emergencies, dial 911. Now available from your iPhone and Android! Please provide this summary of care documentation to your next provider. Your primary care clinician is listed as Olga Davila. Julio Salter. If you have any questions after today's visit, please call [phone].

## 2017-10-25 NOTE — PROGRESS NOTES
Nursing Notes    Patient presents to the office today in follow-up. Patient rates her pain at 10/10 on the numerical pain scale. Reviewed medications with counts as follows:    Rx Date filled Qty Dispensed Pill Count Last Dose Short   Morphine sulf ER 15 mg 09/23/17 60 0 10/22/17 no   Oxycodone HCL 20 mg 09/23/17 120 0 10/22/17 no                                 POC UDS was performed in office today    Any new labs or imaging since last appointment? NO    Have you been to an emergency room (ER) or urgent care clinic since your last visit? NO            Have you been hospitalized since your last visit? NO     If yes, where, when, and reason for visit? Have you seen or consulted any other health care providers outside of the [address] [place] [address]  since your last visit? YES, PCP     If yes, where, when, and reason for visit? HM deferred to pcp.

## 2017-10-25 NOTE — PATIENT INSTRUCTIONS
1. Continue current plan with no evidence of addiction or diversion. Stable on current medication without adverse events. 2. Refill and adjust oxycodone 20 mg down to 15 mg up to 4 times daily on an as-needed basis only. 3. Refill and adjust morphine ER 50 mg up to 30 mg every 12 hours  4. Refill Zanaflex 4 mg 2 times daily as needed. 5. Schedule repeat cervical C7 - T1 interlaminar injection with  Dr. Juliano Dalton  6. Naloxone 4 mg nasal spray for opioid induced respiratory depression emergency only. 7. Discussed risks of addiction, dependency, and opioid induced hyperalgesia.    8. Return to clinic in 1 month

## 2017-11-01 RX ORDER — MIDAZOLAM HYDROCHLORIDE 1 MG/ML
.5-6 INJECTION, SOLUTION INTRAMUSCULAR; INTRAVENOUS
Status: CANCELLED | OUTPATIENT
Start: 2017-11-06

## 2017-11-01 RX ORDER — SODIUM CHLORIDE 0.9 % (FLUSH) 0.9 %
5-10 SYRINGE (ML) INJECTION AS NEEDED
Status: CANCELLED | OUTPATIENT
Start: 2017-11-06

## 2017-11-06 ENCOUNTER — HOSPITAL ENCOUNTER (OUTPATIENT)
Age: 62
Setting detail: OUTPATIENT SURGERY
Discharge: HOME | End: 2017-11-06
Attending: PHYSICAL MEDICINE & REHABILITATION | Admitting: PHYSICAL MEDICINE & REHABILITATION
Payer: MEDICARE

## 2017-11-06 ENCOUNTER — APPOINTMENT (OUTPATIENT)
Dept: GENERAL RADIOLOGY | Age: 62
End: 2017-11-06
Attending: PHYSICAL MEDICINE & REHABILITATION
Payer: MEDICARE

## 2017-11-06 VITALS
TEMPERATURE: 97.8 F | RESPIRATION RATE: 18 BRPM | SYSTOLIC BLOOD PRESSURE: 114 MMHG | DIASTOLIC BLOOD PRESSURE: 74 MMHG | HEART RATE: 67 BPM | OXYGEN SATURATION: 98 %

## 2017-11-06 PROCEDURE — 74011250636 HC RX REV CODE- 250/636

## 2017-11-06 PROCEDURE — 74011000250 HC RX REV CODE- 250: Performed by: PHYSICAL MEDICINE & REHABILITATION

## 2017-11-06 PROCEDURE — 74011250636 HC RX REV CODE- 250/636: Performed by: PHYSICAL MEDICINE & REHABILITATION

## 2017-11-06 PROCEDURE — 74011000250 HC RX REV CODE- 250

## 2017-11-06 PROCEDURE — 74011636320 HC RX REV CODE- 636/320

## 2017-11-06 PROCEDURE — 76010000009 HC PAIN MGT 0 TO 30 MIN PROC: Performed by: PHYSICAL MEDICINE & REHABILITATION

## 2017-11-06 PROCEDURE — 74011636320 HC RX REV CODE- 636/320: Performed by: PHYSICAL MEDICINE & REHABILITATION

## 2017-11-06 RX ORDER — SODIUM CHLORIDE 0.9 % (FLUSH) 0.9 %
5-10 SYRINGE (ML) INJECTION AS NEEDED
Status: DISCONTINUED | OUTPATIENT
Start: 2017-11-06 | End: 2017-11-06 | Stop reason: HOSPADM

## 2017-11-06 RX ORDER — DEXAMETHASONE SODIUM PHOSPHATE 100 MG/10ML
INJECTION INTRAMUSCULAR; INTRAVENOUS AS NEEDED
Status: DISCONTINUED | OUTPATIENT
Start: 2017-11-06 | End: 2017-11-06 | Stop reason: HOSPADM

## 2017-11-06 RX ORDER — LIDOCAINE HYDROCHLORIDE 10 MG/ML
INJECTION, SOLUTION EPIDURAL; INFILTRATION; INTRACAUDAL; PERINEURAL AS NEEDED
Status: DISCONTINUED | OUTPATIENT
Start: 2017-11-06 | End: 2017-11-06 | Stop reason: HOSPADM

## 2017-11-06 RX ORDER — MIDAZOLAM HYDROCHLORIDE 1 MG/ML
INJECTION, SOLUTION INTRAMUSCULAR; INTRAVENOUS AS NEEDED
Status: DISCONTINUED | OUTPATIENT
Start: 2017-11-06 | End: 2017-11-06 | Stop reason: HOSPADM

## 2017-11-06 RX ORDER — FENTANYL CITRATE 50 UG/ML
INJECTION, SOLUTION INTRAMUSCULAR; INTRAVENOUS AS NEEDED
Status: DISCONTINUED | OUTPATIENT
Start: 2017-11-06 | End: 2017-11-06 | Stop reason: HOSPADM

## 2017-11-06 RX ORDER — MIDAZOLAM HYDROCHLORIDE 1 MG/ML
.5-6 INJECTION, SOLUTION INTRAMUSCULAR; INTRAVENOUS
Status: DISCONTINUED | OUTPATIENT
Start: 2017-11-06 | End: 2017-11-06 | Stop reason: HOSPADM

## 2017-11-06 NOTE — PROCEDURES
[id number]    INTERLAMINAR CERVICAL EPIDURAL STEROID INJECTION  PROCEDURE REPORT      PATIENT:  Sonia Mujica  YOB: 1955  DATE OF SERVICE:  11/6/2017  [facility]    PRE-PROCEDURE DIAGNOSIS:  See Above    POST-PROCEDURE DIAGNOSIS:  See Above                PROCEDURE:    1. Interlaminar cervical epidural steroid injection, C7-T1  (40591)  2. Fluoroscopic needle guidance (spinal) (37301)  3. Supervision of moderate sedation (34242)    ANESTHESIA:  Local with moderate IV sedation. See Medication Administration Record for specific medications and dosage. COMPLICATIONS: None. PHYSICIAN:  Kofi Faustin MD    PRE-PROCEDURE NOTE:  Pre-procedural assessment of the patient was performed including a limited history and physical examination. The details of the procedure were discussed with the patient, including the risks, benefits and alternative options and an informed consent was obtained. The patients NPO status, if necessary for the specific procedure and/or administration of moderate intravenous sedation, if utilized, and availability of a responsible adult to escort the patient following the procedure were confirmed. A peripheral intravenous cannula was placed without difficulty and lactated Ringers solution administered. See nursing notes for details. PROCEDURE NOTE:  The patient was brought to the procedure suite and positioned on the fluoroscopy table in the prone position. Physiologic monitors were applied and supplemental oxygen was administered via nasal cannula. The skin was prepped in the standard surgical fashion and sterile drapes were applied over the procedure site.  Please refer to the Flowsheet for documentation of the patients vital signs and the Medication Administration Record for any oral and/or intravenous sedation administered prior to or during the procedure. The above-listed interlaminar space was identified and the skin and subcutaneous tissues were infiltrated with 1% Lidocaine. Under anterior-posterior fluoroscopic guidance an 18g, 3.5-inch Tuohy epidural needle was advanced along the previously identified interlaminar space. The fluoroscope was then turned lateral view and a loss of resistance syringe was attached to the needle containing preservative free normal saline. Under lateral flouroscopic guidance, the needle was then advanced through the ligamentum flavum, entering the epidural space with a clear and crisp loss of resistance. The needle was not advanced beyond the interlaminar line at any time during this process. No CSF was noted. Aspiration was negative for blood or CSF. Additional confirmation was made with the injection of 0.5 mL of a nonionic water-soluble radiographic contrast medium (Isovue-M 200) demonstrating appropriate epidural spread and the absence of vascular uptake. Following this, a 3 mL solution comprised of 2 mL of dexamethasone (10mg/ml) and 1 mL of lidocaine 1% preservative free was injected slowly after negative aspiration. The needle was cleared of steroid solution and removed. The area was thoroughly cleaned and sterile bandages applied as necessary. The patient tolerated the procedure well and vital signs remained stable throughout the procedure. POST-PROCEDURE COURSE:  The patient was escorted from the procedure suite in satisfactory condition and recovered per facility protocol based on the type of procedure performed and/or the sedation utilized. The patient did not experience any adverse events and remained hemodynamically stable during the post-procedure period. DISCHARGE NOTE:  Upon discharge, the patient was able to tolerate fluids and was in no acute distress. The patient was oriented to person, place and time and vital signs were stable.  Appropriate post-procedure instructions were provided and explained to the patient in detail and all questions were answered.     Farida Rivero MD 11/6/2017 3:56 PM

## 2017-11-06 NOTE — INTERVAL H&P NOTE
H&P Update:  Varun Kelly was seen and examined. History and physical has been reviewed. The patient has been examined.  There have been no significant clinical changes since the completion of the originally dated History and Physical.    Signed By: Kings Tobias MD     November 6, 2017 12:29 PM

## 2017-11-06 NOTE — DISCHARGE INSTRUCTIONS
[facility]      Post Procedures Instructions    *Resume Diet and Activity as tolerated. Rest for the remainder of the day. *You may fell worse before you feel better as the numbing medications wear off before the steroids take effect if used for your procedures. *Do not use affected extremity until numbness or loss of sensation has completely resolved without assistance. *DO NOT DRIVE, operate machinery/heavey equipment for 24 hours. *DO NOT DRINK ALCOHOL for 24 hours as it may interact with the sedation if you received it and also thins your blood and may cause you to bleed. *WAIT 24 hours before starting back ANY Blood thinning medications:   (Heparin, Coumadin, Warfarin, Lovenox, Plavix, Aggrenox)    *Resume Pre-Procedure Medications as prescribed except Blood Thinners unless directed by your Physician or Cardiologist.     *Avoid Hot tubs and Heating pad for 24 hours to prevent dissipation of medications, you may shower to remove bandages and remaining prep residue on the skin. * If you develop a Headache, drink plenty of fluids including beverages with caffeine (Coffee, Mt. Dew etc.) and rest.  If the headache persists longer than 24 hoursor intensifies - Please call [facility] ([facility]) [phone]      * If you are DIABETIC, check your blood sugar three times a day for the next three days, the steroids will increase your blood sugar. If your blood sugar is greater than 400 have someone drive you to the nearest [address]. * If you experience any of the following problems, call the [facility] [phone] between 8:00 am - 4:30pm or After Hours [phone].     Shortness of breath    Fever of 101 F or higher    Nausea / Vomiting (not normal to you)    Increasing stiffness in the neck    Weakness or numbness in the arms or legs that is not resolving    Prolonged and increasing pain > than 4 days    ANYTHING OUT of the ORDINARY TO YOU    If YOU are experiencing a severe reaction / complication that you have never had before post procedure, call 911 or go to the nearest emergency room! All patients must have a  for transportation [place] regardless if you do or do not receive sedation. DISCHARGE SUMMARY from Nurse      PATIENT INSTRUCTIONS:    After Oral  or intravenous sedation, for 24 hours or while taking prescription Narcotics:  · Limit your activities  · Do not drive and operate hazardous machinery  · Do not make important personal or business decisions  · Do  not drink alcoholic beverages  · If you have not urinated within 8 hours after discharge, please contact your surgeon on call. Report the following to your surgeon:  · Excessive pain, swelling, redness or odor of or around the surgical area  · Temperature over 101  · Nausea and vomiting lasting longer than 4 hours or if unable to take medications  · Any signs of decreased circulation or nerve impairment to extremity: change in color, persistent  numbness, tingling, coldness or increase pain  · Any questions        What to do at Home:  Recommended activity: Activity as tolerated, NO DRIVING FOR 24 Hours post injection          *  Please give a list of your current medications to your Primary Care Provider. *  Please update this list whenever your medications are discontinued, doses are      changed, or new medications (including over-the-counter products) are added. *  Please carry medication information at all times in case of emergency situations. These are general instructions for a healthy lifestyle:    No smoking/ No tobacco products/ Avoid exposure to second hand smoke    Surgeon General's Warning:  Quitting smoking now greatly reduces serious risk to your health.     Obesity, smoking, and sedentary lifestyle greatly increases your risk for illness    A healthy diet, regular physical exercise & weight monitoring are important for maintaining a healthy lifestyle    You may be retaining fluid if you have a history of heart failure or if you experience any of the following symptoms:  Weight gain of 3 pounds or more overnight or 5 pounds in a week, increased swelling in our hands or feet or shortness of breath while lying flat in bed. Please call your doctor as soon as you notice any of these symptoms; do not wait until your next office visit. Recognize signs and symptoms of STROKE:    F-face looks uneven    A-arms unable to move or move unevenly    S-speech slurred or non-existent    T-time-call 911 as soon as signs and symptoms begin-DO NOT go       Back to bed or wait to see if you get better-TIME IS BRAIN. [employer] Activation    Thank you for requesting access to [employer]. Please follow the instructions below to securely access and download your online medical record. [employer] allows you to send messages to your doctor, view your test results, renew your prescriptions, schedule appointments, and more. How Do I Sign Up? 1. In your internet browser, go to [email or web address]  2. Click on the First Time User? Click Here link in the Sign In box. You will be redirect to the New Member Sign Up page. 3. Enter your [employer] Access Code exactly as it appears below. You will not need to use this code after youve completed the sign-up process. If you do not sign up before the expiration date, you must request a new code. [employer] Access Code: [email or web address]  Expires: 2018  8:08 AM (This is the date your [employer] access code will )    4. Enter the last four digits of your Social Security Number (xxxx) and Date of Birth (mm/dd/yyyy) as indicated and click Submit. You will be taken to the next sign-up page. 5. Create a [employer] ID. This will be your [employer] login ID and cannot be changed, so think of one that is secure and easy to remember. 6. Create a [employer] password.  You can change your password at any time. 7. Enter your Password Reset Question and Answer. This can be used at a later time if you forget your password. 8. Enter your e-mail address. You will receive e-mail notification when new information is available in [address] 9. Click Sign Up. You can now view and download portions of your medical record. 10. Click the Download Summary menu link to download a portable copy of your medical information. Additional Information    If you have questions, please visit the Frequently Asked Questions section of the [employer] website at [email or web address] Remember, [employer] is NOT to be used for urgent needs. For medical emergencies, dial 911.

## 2017-11-06 NOTE — H&P (VIEW-ONLY)
Nursing Notes    Patient presents to the office today in follow-up. Patient rates her pain at 10/10 on the numerical pain scale. Reviewed medications with counts as follows:    Rx Date filled Qty Dispensed Pill Count Last Dose Short   Morphine sulf ER 15 mg 09/23/17 60 0 10/22/17 no   Oxycodone HCL 20 mg 09/23/17 120 0 10/22/17 no                                 POC UDS was performed in office today    Any new labs or imaging since last appointment? NO    Have you been to an emergency room (ER) or urgent care clinic since your last visit? NO            Have you been hospitalized since your last visit? NO     If yes, where, when, and reason for visit? Have you seen or consulted any other health care providers outside of the [address] [place] [address]  since your last visit? YES, PCP     If yes, where, when, and reason for visit? HM deferred to pcp.

## 2017-11-13 ENCOUNTER — TELEPHONE (OUTPATIENT)
Dept: PAIN MANAGEMENT | Age: 62
End: 2017-11-13

## 2017-11-13 NOTE — TELEPHONE ENCOUNTER
Ms. Alisha Carney was contacted for follow-up status post Interlaminar cervical epidural steroid injection on November 6, 2017.  She reports:    Pre-procedure numerical pain score: 10/10  Post-procedure numerical pain score one week after: 5/10  Duration of relief post-procedure (if applicable): 8 days  Improvement in functional activities (if applicable): Yes  Percentage of overall improvement: 50%    COMMENTS:

## 2017-11-20 ENCOUNTER — OFFICE VISIT (OUTPATIENT)
Dept: PAIN MANAGEMENT | Age: 62
End: 2017-11-20

## 2017-11-20 VITALS
WEIGHT: 128 LBS | RESPIRATION RATE: 14 BRPM | DIASTOLIC BLOOD PRESSURE: 73 MMHG | TEMPERATURE: 97.1 F | SYSTOLIC BLOOD PRESSURE: 124 MMHG | BODY MASS INDEX: 23.55 KG/M2 | HEIGHT: 62 IN | HEART RATE: 65 BPM

## 2017-11-20 DIAGNOSIS — M47.816 SPONDYLOSIS OF LUMBAR REGION WITHOUT MYELOPATHY OR RADICULOPATHY: ICD-10-CM

## 2017-11-20 DIAGNOSIS — G89.29 CHRONIC BILATERAL LOW BACK PAIN WITH RIGHT-SIDED SCIATICA: ICD-10-CM

## 2017-11-20 DIAGNOSIS — M47.26 OSTEOARTHRITIS OF SPINE WITH RADICULOPATHY, LUMBAR REGION: ICD-10-CM

## 2017-11-20 DIAGNOSIS — M54.41 CHRONIC BILATERAL LOW BACK PAIN WITH RIGHT-SIDED SCIATICA: ICD-10-CM

## 2017-11-20 DIAGNOSIS — M54.12 CERVICAL RADICULOPATHY: ICD-10-CM

## 2017-11-20 DIAGNOSIS — M47.812 SPONDYLOSIS OF CERVICAL REGION WITHOUT MYELOPATHY OR RADICULOPATHY: ICD-10-CM

## 2017-11-20 DIAGNOSIS — M48.02 CERVICAL SPINAL STENOSIS: ICD-10-CM

## 2017-11-20 DIAGNOSIS — M54.17 LUMBOSACRAL NEURITIS: ICD-10-CM

## 2017-11-20 DIAGNOSIS — M47.22 OSTEOARTHRITIS OF SPINE WITH RADICULOPATHY, CERVICAL REGION: ICD-10-CM

## 2017-11-20 DIAGNOSIS — M54.10 RADICULITIS: ICD-10-CM

## 2017-11-20 DIAGNOSIS — G89.4 CHRONIC PAIN SYNDROME: ICD-10-CM

## 2017-11-20 DIAGNOSIS — M54.16 LUMBAR NEURITIS: ICD-10-CM

## 2017-11-20 RX ORDER — MORPHINE SULFATE 30 MG/1
30 TABLET, FILM COATED, EXTENDED RELEASE ORAL EVERY 12 HOURS
Qty: 60 TAB | Refills: 0 | Status: SHIPPED | OUTPATIENT
Start: 2017-12-23 | End: 2018-01-22

## 2017-11-20 RX ORDER — MORPHINE SULFATE 30 MG/1
30 TABLET, FILM COATED, EXTENDED RELEASE ORAL EVERY 12 HOURS
Qty: 60 TAB | Refills: 0 | Status: SHIPPED | OUTPATIENT
Start: 2017-11-24 | End: 2017-12-24

## 2017-11-20 RX ORDER — OXYCODONE HYDROCHLORIDE 15 MG/1
15 TABLET ORAL
Qty: 120 TAB | Refills: 0 | Status: SHIPPED | OUTPATIENT
Start: 2017-11-24 | End: 2017-12-24

## 2017-11-20 RX ORDER — MORPHINE SULFATE 30 MG/1
30 TABLET, FILM COATED, EXTENDED RELEASE ORAL EVERY 12 HOURS
Qty: 60 TAB | Refills: 0 | Status: SHIPPED | OUTPATIENT
Start: 2018-01-23 | End: 2018-02-22

## 2017-11-20 RX ORDER — OXYCODONE HYDROCHLORIDE 20 MG/1
20 TABLET ORAL
Qty: 120 TAB | Refills: 0 | Status: SHIPPED | OUTPATIENT
Start: 2018-01-22 | End: 2018-02-21

## 2017-11-20 RX ORDER — OXYCODONE HYDROCHLORIDE 15 MG/1
15 TABLET ORAL
Qty: 120 TAB | Refills: 0 | Status: SHIPPED | OUTPATIENT
Start: 2017-12-23 | End: 2018-01-22

## 2017-11-20 NOTE — PATIENT INSTRUCTIONS
1. Continue current plan with no evidence of addiction or diversion. Stable on current medication without adverse events. 2. Refill oxycodone 15 mg up to 4 times daily on an as-needed basis only. 3. Refill  morphine ER  30 mg every 12 hours  4. Continue Zanaflex 4 mg 2 times daily as needed. 5. Continue with repeat cervical C7 - T1 interlaminar injection with  Dr. Adina Kent  6. Naloxone 4 mg nasal spray for opioid induced respiratory depression emergency only. 7. Discussed risks of addiction, dependency, and opioid induced hyperalgesia.    8. Return to clinic in 3 month

## 2017-11-20 NOTE — PROGRESS NOTES
Nursing Notes    Patient presents to the office today in follow-up. Patient rates her pain at 7/10 on the numerical pain scale. Reviewed medications with counts as follows:    Rx Date filled Qty Dispensed Pill Count Last Dose Short   Tizanidine 4mg 10/25/17 60 12 2 days ago no   Morphine ER 30mg 10/25/17 60 7 today no   Oxycodone 15mg 10/25/17 120 15 today                           Comments:     POC UDS was not performed in office today    Any new labs or imaging since last appointment? NO    Have you been to an emergency room (ER) or urgent care clinic since your last visit? NO            Have you been hospitalized since your last visit? NO     If yes, where, when, and reason for visit? Have you seen or consulted any other health care providers outside of the [address] [place] [address]  since your last visit? NO     If yes, where, when, and reason for visit? HM deferred to pcp.

## 2017-11-20 NOTE — PROGRESS NOTES
HISTORY OF PRESENT ILLNESS  Vern Mariee is a 65 y.o. female    HPI: Ms. Adrianna Tafoya  returns today for f/u of chronic, severe pain involving the lumbar spine with radiation down the right lower leg related to underlying degenerative disc disease, and spondylosis. No h/o lumbar surgery. Lumbar Epidural injections and PT with no benefit. She has received cervical epidural injection by Dr. Carolee Brown in the past with good improvement. Ms. Adrianna Tafoya continues unchanged since last visit. We adjust her morphine up to 30 mg last visit with minimal improvement so far. We also tapered her oxycodone down to 15 mg.  I continue to encourage her to use her oxycodone on an as-needed basis only. We did discuss options in detail today. She is afraid of fentanyl and then states that she would rather just stay with her current treatment plan for now. She recently received repeat cervical epidural injection by Dr. Carolee Brown and is planning to have a series of 3 total.  She has not scheduled her second injection. She is otherwise doing well with no other complaints. She is currently in the process of transitioning her care but has not made an appointment at this time. I will have her follow-up in 3 months for further evaluation and recommendation. She understands to call and cancel this appointment and her pain management agreement if she has transition her care by that time. Current medication management consists of Oxycodone 15 mg QID PRN, morphine ER 30 mg every 12 hours, and tizanidine as needed. .     Medications are helping with pain control and quality of life. Her pain is 6/10 with medication and 10/10 without. Pt describes pain as aching. Aggravating factors include standing and walking. Relieved with rest, medication, and avoiding painful activities. Current treatment is helping to improve general activity, mood, walking, sleep, enjoyment of life    She  is otherwise doing well with no other complaints today.  She denies any adverse events including nausea, vomiting, dizziness, constipation, hallucinations, or seizures. Because the patient's current regimen places him/her at increased risk for possible overdose, a prescription for naloxone nasal spray has been provided. The patient understands that this medication is only to be used in the setting of a possible overdose and that inadvertent use of this medication could precipitate overt withdrawal.       Allergies   Allergen Reactions    Pcn [Penicillins] Swelling       Past Surgical History:   Procedure Laterality Date    HX OTHER SURGICAL      mass removed from breast 5-2011         Review of Systems   Constitutional: Positive for malaise/fatigue and weight loss. Negative for chills and fever. HENT: Negative for congestion and sore throat. Eyes: Negative for blurred vision and double vision. Respiratory: Positive for cough. Negative for shortness of breath. Cardiovascular: Negative for chest pain and leg swelling. Gastrointestinal: Positive for heartburn. Negative for constipation, diarrhea, nausea and vomiting. Genitourinary: Negative for hematuria. Musculoskeletal: Positive for back pain, joint pain, myalgias and neck pain. Negative for falls. Skin: Negative. Neurological: Positive for tremors, sensory change and headaches. Negative for dizziness, tingling, seizures and weakness. Endo/Heme/Allergies: Negative for environmental allergies. Does not bruise/bleed easily. Psychiatric/Behavioral: Positive for depression. Negative for substance abuse and suicidal ideas. The patient is nervous/anxious and has insomnia. Bipolar, Sees psychiatry monthly (Dr. Yuan Allen)   All other systems reviewed and are negative. Physical Exam   Constitutional: She is oriented to person, place, and time and well-developed, well-nourished, and in no distress. No distress. HENT:   Head: Normocephalic and atraumatic.    Eyes: EOM are normal.   Neck: Normal range of motion. Pulmonary/Chest: Effort normal.   Musculoskeletal: Normal range of motion. Cervical back: She exhibits tenderness and pain. Lumbar back: She exhibits tenderness and pain. Neurological: She is alert and oriented to person, place, and time. Skin: Skin is warm and dry. No rash noted. She is not diaphoretic. No erythema. Psychiatric: Mood, memory, affect and judgment normal.   Nursing note and vitals reviewed. ASSESSMENT:    1. Chronic bilateral low back pain with right-sided sciatica    2. Chronic pain syndrome    3. Radiculitis    4. Osteoarthritis of spine with radiculopathy, lumbar region    5. Lumbosacral neuritis    6. Lumbar neuritis    7. Cervical radiculopathy    8. Osteoarthritis of spine with radiculopathy, cervical region    9. Spondylosis of cervical region without myelopathy or radiculopathy    10. Cervical spinal stenosis    11. Spondylosis of lumbar region without myelopathy or radiculopathy         [place] Prescription Monitoring Program was reviewed which does not demonstrate aberrancies and/or inconsistencies with regard to the historical prescribing of controlled medications to this patient by other providers. PLAN / Pt Instructions:  1. Continue current plan with no evidence of addiction or diversion. Stable on current medication without adverse events. 2. Refill oxycodone 15 mg up to 4 times daily on an as-needed basis only. 3. Refill  morphine ER  30 mg every 12 hours  4. Continue Zanaflex 4 mg 2 times daily as needed. 5. Continue with repeat cervical C7 - T1 interlaminar injection with  Dr. Lila Moser  6. Naloxone 4 mg nasal spray for opioid induced respiratory depression emergency only. 7. Discussed risks of addiction, dependency, and opioid induced hyperalgesia.    8. Return to clinic in 3 month     Medications Ordered Today   Medications    morphine CR (MS CONTIN) 30 mg CR tablet     Sig: Take 1 Tab by mouth every twelve (12) hours for 30 days. Max Daily Amount: 60 mg. For chronic pain  Indications: Chronic Pain, Severe Pain     Dispense:  60 Tab     Refill:  0    morphine CR (MS CONTIN) 30 mg CR tablet     Sig: Take 1 Tab by mouth every twelve (12) hours for 30 days. Max Daily Amount: 60 mg. For chronic pain  Indications: Chronic Pain, Severe Pain     Dispense:  60 Tab     Refill:  0    morphine CR (MS CONTIN) 30 mg CR tablet     Sig: Take 1 Tab by mouth every twelve (12) hours for 30 days. Max Daily Amount: 60 mg. For chronic pain  Indications: Chronic Pain, Severe Pain     Dispense:  60 Tab     Refill:  0    oxyCODONE IR (OXY-IR) 15 mg immediate release tablet     Sig: Take 1 Tab by mouth four (4) times daily as needed for up to 30 days. Max Daily Amount: 60 mg. Indications: Pain     Dispense:  120 Tab     Refill:  0    oxyCODONE IR (OXY-IR) 15 mg immediate release tablet     Sig: Take 1 Tab by mouth four (4) times daily as needed for up to 30 days. Max Daily Amount: 60 mg. Indications: Pain     Dispense:  120 Tab     Refill:  0    oxyCODONE IR (ROXICODONE) 20 mg immediate release tablet     Sig: Take 1 Tab by mouth four (4) times daily as needed for up to 30 days. Max Daily Amount: 80 mg. Indications: Pain     Dispense:  120 Tab     Refill:  0       Pain medications prescribed with the objective of pain relief and improved physical and psychosocial function in this patient. Spent 25 minutes with patient today reviewing the treatment plan, goals of treatment plan, and limitations of the treatment plan, to include the potential for side effects from medications and procedures. Reji Vaz 11/20/2017      Note: Please excuse any typographical errors. Voice recognition software was used for this note and may cause mistakes.

## 2017-11-20 NOTE — MR AVS SNAPSHOT
Visit Information Date & Time Provider Department Dept. Phone Encounter #  
 11/20/2017 12:40 PM Felicia Vyas, [address] [place] [address] for Pain Management [phone] Follow-up Instructions Return in about 3 months (around 2/20/2018). Upcoming Health Maintenance Date Due Hepatitis C Screening 1955 Pneumococcal 19-64 Medium Risk (1 of 1 - PPSV23) 5/4/1975 DTaP/Tdap/Td series (1 - Tdap) 5/4/1977 PAP AKA CERVICAL CYTOLOGY 5/4/1977 BREAST CANCER SCRN MAMMOGRAM 5/4/2006 FOBT Q 1 YEAR AGE 50-75 5/4/2006 ZOSTER VACCINE AGE 60> 3/4/2016 Influenza Age 5 to Adult 8/1/2017 Allergies as of 11/20/2017  Review Complete On: 11/20/2017 By: Alyson Méndez Severity Noted Reaction Type Reactions Pcn [Penicillins]  07/09/2013    Swelling Current Immunizations  Never Reviewed No immunizations on file. Not reviewed this visit You Were Diagnosed With   
  
 Codes Comments Chronic bilateral low back pain with right-sided sciatica     ICD-10-CM: M54.41, G89.29 ICD-9-CM: 724.2, 724.3, 338.29 Chronic pain syndrome     ICD-10-CM: G89.4 ICD-9-CM: 338.4 Radiculitis     ICD-10-CM: M54.10 ICD-9-CM: 729.2 Osteoarthritis of spine with radiculopathy, lumbar region     ICD-10-CM: M47.26 
ICD-9-CM: 721.3 Lumbosacral neuritis     ICD-10-CM: M54.17 ICD-9-CM: 724.4 Lumbar neuritis     ICD-10-CM: M54.16 
ICD-9-CM: 724.4 Cervical radiculopathy     ICD-10-CM: M54.12 
ICD-9-CM: 723.4 Osteoarthritis of spine with radiculopathy, cervical region     ICD-10-CM: M47.22 
ICD-9-CM: 721.0 Spondylosis of cervical region without myelopathy or radiculopathy     ICD-10-CM: M47.812 ICD-9-CM: 721.0 Cervical spinal stenosis     ICD-10-CM: M48.02 
ICD-9-CM: 723.0 Spondylosis of lumbar region without myelopathy or radiculopathy     ICD-10-CM: M47.816 ICD-9-CM: 721.3 Vitals BP Pulse Temp Resp Height(growth percentile) Weight(growth percentile) 124/73 65 97.1 °F (36.2 °C) 14 5' 2\" (1.575 m) 128 lb (58.1 kg) BMI OB Status Smoking Status 23.41 kg/m2 Postmenopausal Current Every Day Smoker Vitals History BMI and BSA Data Body Mass Index Body Surface Area  
 23.41 kg/m 2 1.59 m 2 Preferred Pharmacy Pharmacy Name Phone [address] [place] [address] [phone] Your Updated Medication List  
  
   
This list is accurate as of: 11/20/17  1:45 PM.  Always use your most recent med list.  
  
  
  
  
 aspirin delayed-release 81 mg tablet Take  by mouth daily. buPROPion 100 mg tablet Commonly known as:  [facility] Take 300 mg by mouth. [facility] Use as directed to assist with ambulation DULoxetine 20 mg capsule Commonly known as:  CYMBALTA Take 20 mg by mouth daily. ergocalciferol 50,000 unit capsule Commonly known as:  ERGOCALCIFEROL Take 50,000 Units by mouth.  
  
 gabapentin 300 mg capsule Commonly known as:  NEURONTIN Take 1 capsule once in the evening for one week, then 2 times a day thereafter. #53 LATUDA 20 mg Tab tablet Generic drug:  lurasidone Take  by mouth. * morphine CR 30 mg CR tablet Commonly known as:  MS CONTIN Take 1 Tab by mouth every twelve (12) hours for 30 days. Max Daily Amount: 60 mg. For chronic pain  Indications: Chronic Pain, Severe Pain Start taking on:  11/24/2017 * morphine CR 30 mg CR tablet Commonly known as:  MS CONTIN Take 1 Tab by mouth every twelve (12) hours for 30 days. Max Daily Amount: 60 mg. For chronic pain  Indications: Chronic Pain, Severe Pain Start taking on:  12/23/2017 * morphine CR 30 mg CR tablet Commonly known as:  MS CONTIN Take 1 Tab by mouth every twelve (12) hours for 30 days. Max Daily Amount: 60 mg. For chronic pain  Indications: Chronic Pain, Severe Pain Start taking on:  1/23/2018  
  
 naloxone 4 mg/actuation nasal spray Commonly known as:  NARCAN  
4 mg by Nasal route as needed. For emergency use only  Indications: OPIATE-INDUCED RESPIRATORY DEPRESSION  
  
 * oxyCODONE IR 15 mg immediate release tablet Commonly known as:  OXY-IR Take 1 Tab by mouth four (4) times daily as needed for up to 30 days. Max Daily Amount: 60 mg. Indications: Pain Start taking on:  11/24/2017 * oxyCODONE IR 15 mg immediate release tablet Commonly known as:  OXY-IR Take 1 Tab by mouth four (4) times daily as needed for up to 30 days. Max Daily Amount: 60 mg. Indications: Pain Start taking on:  12/23/2017 * oxyCODONE IR 20 mg immediate release tablet Commonly known as:  Matthew Alejandroen Take 1 Tab by mouth four (4) times daily as needed for up to 30 days. Max Daily Amount: 80 mg. Indications: Pain Start taking on:  1/22/2018 PriLOSEC 20 mg capsule Generic drug:  omeprazole Take 20 mg by mouth daily. primidone 50 mg tablet Commonly known as: MYSOLINE Take 50 mg by mouth two (2) times a day. pyridoxine (vitamin B6) 100 mg tablet Commonly known as:  VITAMIN B-6 Take 100 mg by mouth daily. raNITIdine 75 mg tablet Commonly known as:  ZANTAC Take 75 mg by mouth two (2) times a day. simvastatin 20 mg tablet Commonly known as:  ZOCOR Take  by mouth nightly. sucralfate 1 gram tablet Commonly known as:  Gay Kirks Take 1 g by mouth four (4) times daily. tiZANidine 4 mg tablet Commonly known as:  Citlalli Mail Take 1 Tab by mouth two (2) times daily as needed for up to 30 days. VALIUM 5 mg tablet Generic drug:  diazePAM  
Take 5 mg by mouth daily as needed. * Notice: This list has 6 medication(s) that are the same as other medications prescribed for you. Read the directions carefully, and ask your doctor or other care provider to review them with you. Prescriptions Printed Refills  
 morphine CR (MS CONTIN) 30 mg CR tablet 0 Starting on: 11/24/2017 Sig: Take 1 Tab by mouth every twelve (12) hours for 30 days. Max Daily Amount: 60 mg. For chronic pain  Indications: Chronic Pain, Severe Pain Class: Print Route: Oral  
 morphine CR (MS CONTIN) 30 mg CR tablet 0 Starting on: 12/23/2017 Sig: Take 1 Tab by mouth every twelve (12) hours for 30 days. Max Daily Amount: 60 mg. For chronic pain  Indications: Chronic Pain, Severe Pain Class: Print Route: Oral  
 morphine CR (MS CONTIN) 30 mg CR tablet 0 Starting on: 1/23/2018 Sig: Take 1 Tab by mouth every twelve (12) hours for 30 days. Max Daily Amount: 60 mg. For chronic pain  Indications: Chronic Pain, Severe Pain Class: Print Route: Oral  
 oxyCODONE IR (OXY-IR) 15 mg immediate release tablet 0 Starting on: 11/24/2017 Sig: Take 1 Tab by mouth four (4) times daily as needed for up to 30 days. Max Daily Amount: 60 mg. Indications: Pain Class: Print Route: Oral  
 oxyCODONE IR (OXY-IR) 15 mg immediate release tablet 0 Starting on: 12/23/2017 Sig: Take 1 Tab by mouth four (4) times daily as needed for up to 30 days. Max Daily Amount: 60 mg. Indications: Pain Class: Print Route: Oral  
 oxyCODONE IR (ROXICODONE) 20 mg immediate release tablet 0 Starting on: 1/22/2018 Sig: Take 1 Tab by mouth four (4) times daily as needed for up to 30 days. Max Daily Amount: 80 mg. Indications: Pain Class: Print Route: Oral  
  
Follow-up Instructions Return in about 3 months (around 2/20/2018). Patient Instructions 1. Continue current plan with no evidence of addiction or diversion. Stable on current medication without adverse events. 2. Refill oxycodone 15 mg up to 4 times daily on an as-needed basis only. 3. Refill  morphine ER  30 mg every 12 hours 4. Continue Zanaflex 4 mg 2 times daily as needed. 5. Continue with repeat cervical C7 - T1 interlaminar injection with  Dr. Aguirre Setting 6. Naloxone 4 mg nasal spray for opioid induced respiratory depression emergency only. 7. Discussed risks of addiction, dependency, and opioid induced hyperalgesia. 8. Return to clinic in 3 month Introducing \[facility]\"" & HEALTH SERVICES! Marietta Sims introduces [employer] patient portal. Now you can access parts of your medical record, email your doctor's office, and request medication refills online. 1. In your internet browser, go to [email or web address] "[email or web address] 2. Click on the First Time User? Click Here link in the Sign In box. You will see the New Member Sign Up page. 3. Enter your [employer] Access Code exactly as it appears below. You will not need to use this code after youve completed the sign-up process. If you do not sign up before the expiration date, you must request a new code. · [employer] Access Code: [email or web address] Expires: 1/23/2018  8:08 AM 
 
4. Enter the last four digits of your Social Security Number (xxxx) and Date of Birth (mm/dd/yyyy) as indicated and click Submit. You will be taken to the next sign-up page. 5. Create a [employer] ID. This will be your [employer] login ID and cannot be changed, so think of one that is secure and easy to remember. 6. Create a [employer] password. You can change your password at any time. 7. Enter your Password Reset Question and Answer. This can be used at a later time if you forget your password. 8. Enter your e-mail address. You will receive e-mail notification when new information is available in [address]. 9. Click Sign Up. You can now view and download portions of your medical record. 10. Click the Download Summary menu link to download a portable copy of your medical information. If you have questions, please visit the Frequently Asked Questions section of the [employer] website.  Remember, [employer] is NOT to be used for urgent needs. For medical emergencies, dial 911. Now available from your iPhone and Android! Please provide this summary of care documentation to your next provider. Your primary care clinician is listed as Jada Porter. Darylene Mori. If you have any questions after today's visit, please call [phone].

## 2018-02-21 ENCOUNTER — TELEPHONE (OUTPATIENT)
Dept: PAIN MANAGEMENT | Age: 63
End: 2018-02-21

## 2018-02-21 ENCOUNTER — HOSPITAL ENCOUNTER (EMERGENCY)
Age: 63
Discharge: HOME | End: 2018-02-21
Attending: EMERGENCY MEDICINE
Payer: MEDICARE

## 2018-02-21 VITALS
TEMPERATURE: 96.8 F | DIASTOLIC BLOOD PRESSURE: 98 MMHG | OXYGEN SATURATION: 100 % | HEART RATE: 70 BPM | SYSTOLIC BLOOD PRESSURE: 120 MMHG | RESPIRATION RATE: 18 BRPM

## 2018-02-21 DIAGNOSIS — R10.84 ABDOMINAL PAIN, GENERALIZED: ICD-10-CM

## 2018-02-21 DIAGNOSIS — R45.851 PASSIVE SUICIDAL IDEATIONS: Primary | ICD-10-CM

## 2018-02-21 LAB
ALBUMIN SERPL-MCNC: 3.5 G/DL (ref 3.4–5)
ALBUMIN/GLOB SERPL: 1 {RATIO} (ref 0.8–1.7)
ALP SERPL-CCNC: 135 U/L (ref 45–117)
ALT SERPL-CCNC: 23 U/L (ref 13–56)
AMPHET UR QL SCN: NEGATIVE
ANION GAP SERPL CALC-SCNC: 7 MMOL/L (ref 3–18)
AST SERPL-CCNC: 18 U/L (ref 15–37)
BARBITURATES UR QL SCN: NEGATIVE
BASOPHILS # BLD: 0 K/UL (ref 0–0.1)
BASOPHILS NFR BLD: 0 % (ref 0–2)
BENZODIAZ UR QL: POSITIVE
BILIRUB SERPL-MCNC: 0.2 MG/DL (ref 0.2–1)
BUN SERPL-MCNC: 12 MG/DL (ref 7–18)
BUN/CREAT SERPL: 13 (ref 12–20)
CALCIUM SERPL-MCNC: 9.4 MG/DL (ref 8.5–10.1)
CANNABINOIDS UR QL SCN: NEGATIVE
CHLORIDE SERPL-SCNC: 109 MMOL/L (ref 100–108)
CO2 SERPL-SCNC: 24 MMOL/L (ref 21–32)
COCAINE UR QL SCN: NEGATIVE
CREAT SERPL-MCNC: 0.89 MG/DL (ref 0.6–1.3)
DIFFERENTIAL METHOD BLD: ABNORMAL
EOSINOPHIL # BLD: 0.2 K/UL (ref 0–0.4)
EOSINOPHIL NFR BLD: 2 % (ref 0–5)
ERYTHROCYTE [DISTWIDTH] IN BLOOD BY AUTOMATED COUNT: 17.8 % (ref 11.6–14.5)
ETHANOL SERPL-MCNC: <3 MG/DL (ref 0–3)
GLOBULIN SER CALC-MCNC: 3.6 G/DL (ref 2–4)
GLUCOSE SERPL-MCNC: 84 MG/DL (ref 74–99)
HCT VFR BLD AUTO: 43.6 % (ref 35–45)
HDSCOM,HDSCOM: ABNORMAL
HGB BLD-MCNC: 14.4 G/DL (ref 12–16)
LIPASE SERPL-CCNC: 335 U/L (ref 73–393)
LYMPHOCYTES # BLD: 3 K/UL (ref 0.9–3.6)
LYMPHOCYTES NFR BLD: 34 % (ref 21–52)
MCH RBC QN AUTO: 28.9 PG (ref 24–34)
MCHC RBC AUTO-ENTMCNC: 33 G/DL (ref 31–37)
MCV RBC AUTO: 87.6 FL (ref 74–97)
METHADONE UR QL: NEGATIVE
MONOCYTES # BLD: 0.7 K/UL (ref 0.05–1.2)
MONOCYTES NFR BLD: 8 % (ref 3–10)
NEUTS SEG # BLD: 4.9 K/UL (ref 1.8–8)
NEUTS SEG NFR BLD: 56 % (ref 40–73)
OPIATES UR QL: NEGATIVE
PCP UR QL: NEGATIVE
PLATELET # BLD AUTO: 477 K/UL (ref 135–420)
PMV BLD AUTO: 10.4 FL (ref 9.2–11.8)
POTASSIUM SERPL-SCNC: 4.1 MMOL/L (ref 3.5–5.5)
PROT SERPL-MCNC: 7.1 G/DL (ref 6.4–8.2)
RBC # BLD AUTO: 4.98 M/UL (ref 4.2–5.3)
SODIUM SERPL-SCNC: 140 MMOL/L (ref 136–145)
WBC # BLD AUTO: 8.7 K/UL (ref 4.6–13.2)

## 2018-02-21 PROCEDURE — 99284 EMERGENCY DEPT VISIT MOD MDM: CPT

## 2018-02-21 PROCEDURE — 80307 DRUG TEST PRSMV CHEM ANLYZR: CPT | Performed by: EMERGENCY MEDICINE

## 2018-02-21 PROCEDURE — 83690 ASSAY OF LIPASE: CPT | Performed by: EMERGENCY MEDICINE

## 2018-02-21 PROCEDURE — 74011250637 HC RX REV CODE- 250/637: Performed by: EMERGENCY MEDICINE

## 2018-02-21 PROCEDURE — 96360 HYDRATION IV INFUSION INIT: CPT

## 2018-02-21 PROCEDURE — 85025 COMPLETE CBC W/AUTO DIFF WBC: CPT | Performed by: EMERGENCY MEDICINE

## 2018-02-21 PROCEDURE — 80053 COMPREHEN METABOLIC PANEL: CPT | Performed by: EMERGENCY MEDICINE

## 2018-02-21 PROCEDURE — 74011250636 HC RX REV CODE- 250/636: Performed by: EMERGENCY MEDICINE

## 2018-02-21 RX ORDER — ONDANSETRON 4 MG/1
4 TABLET, FILM COATED ORAL
Status: COMPLETED | OUTPATIENT
Start: 2018-02-21 | End: 2018-02-21

## 2018-02-21 RX ORDER — ACETAMINOPHEN AND CODEINE PHOSPHATE 300; 30 MG/1; MG/1
1 TABLET ORAL
Status: COMPLETED | OUTPATIENT
Start: 2018-02-21 | End: 2018-02-21

## 2018-02-21 RX ADMIN — ONDANSETRON HYDROCHLORIDE 4 MG: 4 TABLET, FILM COATED ORAL at 16:34

## 2018-02-21 RX ADMIN — SODIUM CHLORIDE 1000 ML: 900 INJECTION, SOLUTION INTRAVENOUS at 16:34

## 2018-02-21 RX ADMIN — ACETAMINOPHEN AND CODEINE PHOSPHATE 1 TABLET: 300; 30 TABLET ORAL at 19:04

## 2018-02-21 NOTE — ED PROVIDER NOTES
EMERGENCY DEPARTMENT HISTORY AND PHYSICAL EXAM    3:37 PM      Date: 2/21/2018  Patient Name: Jeanie Castelan    History of Presenting Illness     Chief Complaint   Patient presents with   [address] Problem    Abdominal Pain         History Provided By: Patient    Chief Complaint: abdominal pain  Duration:  Weeks  Timing:  Constant  Location: GI  Quality: Sharp  Severity: 10 out of 10  Modifying Factors: none  Associated Symptoms: diarrhea      Additional History (Context): Jeanie Castelan is a 65 y.o. female with COPD, bipolar disorder, kidney stones and UTI who presents with severe and constant abdominal pain with diarrhea for the past 2 weeks. The pt states she waited to be seen because she didn't want to see a doctor. The pt presented today because her brother called pain management who recommended to him to bring her to the ED. She reports she has been dealing with this issue for over a year. The pt did not try anything OTC for her pain. When asked about SI the pt refused to answer the question. The pt denies CP, nausea, vomiting, hematuria, dysuria, SOB, cough, fever, chills, and HI. The pt had no other complaints or concerns in the ED. PCP: Emery Tavares MD    Current Facility-Administered Medications   Medication Dose Route Frequency Provider Last Rate Last Dose    acetaminophen-codeine (TYLENOL #3) per tablet 1 Tab  1 Tab Oral NOW Josef Kohli DO         Current Outpatient Prescriptions   Medication Sig Dispense Refill    morphine CR (MS CONTIN) 30 mg CR tablet Take 1 Tab by mouth every twelve (12) hours for 30 days. Max Daily Amount: 60 mg. For chronic pain  Indications: Chronic Pain, Severe Pain 60 Tab 0    oxyCODONE IR (ROXICODONE) 20 mg immediate release tablet Take 1 Tab by mouth four (4) times daily as needed for up to 30 days. Max Daily Amount: 80 mg. Indications: Pain 120 Tab 0    naloxone 4 mg/actuation spry 4 mg by Nasal route as needed.  For emergency use only Indications: OPIATE-INDUCED RESPIRATORY DEPRESSION 1 Package 0    buPROPion (WELLBUTRIN) 100 mg tablet Take 300 mg by mouth.  lurasidone (LATUDA) 20 mg tab tablet Take  by mouth.  sucralfate (CARAFATE) 1 gram tablet Take 1 g by mouth four (4) times daily.  gabapentin (NEURONTIN) 300 mg capsule Take 1 capsule once in the evening for one week, then 2 times a day thereafter. #53 53 Cap 0    ranitidine (ZANTAC) 75 mg tablet Take 75 mg by mouth two (2) times a day.  DULoxetine (CYMBALTA) 20 mg capsule Take 20 mg by mouth daily.  simvastatin (ZOCOR) 20 mg tablet Take  by mouth nightly.  pyridoxine (VITAMIN B-6) 100 mg tablet Take 100 mg by mouth daily.  ergocalciferol (ERGOCALCIFEROL) 50,000 unit capsule Take 50,000 Units by mouth.  Cane jus Use as directed to assist with ambulation 1 Device 0    primidone (MYSOLINE) 50 mg tablet Take 50 mg by mouth two (2) times a day.  aspirin delayed-release 81 mg tablet Take  by mouth daily.  diazepam (VALIUM) 5 mg tablet Take 5 mg by mouth daily as needed.  omeprazole (PRILOSEC) 20 mg capsule Take 20 mg by mouth daily.          Past History     Past Medical History:  Past Medical History:   Diagnosis Date    Back injury     Bipolar 1 disorder ([facility]     COPD (chronic obstructive pulmonary disease) ([facility]     Depression     Headache(784.0)     Heartburn     Kidney stone     Mental disorder     Microhematuria     Neck injury     Nervous disorder     Tremor     Unspecified cerebral artery occlusion with cerebral infarction     UTI (lower urinary tract infection)        Past Surgical History:  Past Surgical History:   Procedure Laterality Date    HX OTHER SURGICAL      mass removed from breast 5-2011       Family History:  Family History   Problem Relation Age of Onset    Heart Attack Mother     Heart Disease Mother     Cancer Father     Heart Attack Father     Heart Disease Father     Stroke Father     Cancer Sister     Diabetes Sister     Hypertension Sister        Social History:  Social History   Substance Use Topics    Smoking status: Current Every Day Smoker     Packs/day: 1.00    Smokeless tobacco: Never Used    Alcohol use No       Allergies: Allergies   Allergen Reactions    Pcn [Penicillins] Swelling         Review of Systems       Review of Systems   Constitutional: Negative for chills and fever. Respiratory: Negative for shortness of breath. Cardiovascular: Negative for chest pain. Gastrointestinal: Positive for abdominal pain and diarrhea. Negative for nausea and vomiting. All other systems reviewed and are negative. Physical Exam     Visit Vitals    BP (!) 123/107 (BP 1 Location: Right arm, BP Patient Position: Sitting)    Pulse 71    Temp 95.8 °F (35.4 °C)    Resp 16    SpO2 100%         Physical Exam   Constitutional: She appears well-developed and well-nourished. Non-toxic appearance. She does not have a sickly appearance. She does not appear ill. No distress. HENT:   Head: Normocephalic and atraumatic. Mouth/Throat: Oropharynx is clear and moist. No oropharyngeal exudate. Eyes: Conjunctivae and EOM are normal. Pupils are equal, round, and reactive to light. No scleral icterus. Neck: Normal range of motion. Neck supple. No hepatojugular reflux and no JVD present. No tracheal deviation present. No thyromegaly present. Cardiovascular: Normal rate, regular rhythm, S1 normal, S2 normal, normal heart sounds, intact distal pulses and normal pulses. Exam reveals no gallop, no S3 and no S4. No murmur heard. Pulses:       Radial pulses are 2+ on the right side, and 2+ on the left side. Dorsalis pedis pulses are 2+ on the right side, and 2+ on the left side. Pulmonary/Chest: Effort normal and breath sounds normal. No respiratory distress. She has no decreased breath sounds. She has no wheezes. She has no rhonchi. She has no rales. Abdominal: Soft.  Normal appearance and bowel sounds are normal. She exhibits no distension, no fluid wave, no ascites and no mass. There is no hepatosplenomegaly. There is tenderness in the epigastric area. There is no rigidity, no rebound, no guarding, no CVA tenderness, no tenderness at McBurney's point and negative Sanchez's sign. Musculoskeletal: Normal range of motion. Strength 5/5 throughout    Lymphadenopathy:        Head (right side): No submental, no submandibular, no preauricular and no occipital adenopathy present. Head (left side): No submental, no submandibular, no preauricular and no occipital adenopathy present. She has no cervical adenopathy. Right: No supraclavicular adenopathy present. Left: No supraclavicular adenopathy present. Neurological: She is alert. She has normal strength and normal reflexes. She is not disoriented. No cranial nerve deficit or sensory deficit. Coordination and gait normal. GCS eye subscore is 4. GCS verbal subscore is 5. GCS motor subscore is 6. Grossly intact    Skin: Skin is warm, dry and intact. No rash noted. She is not diaphoretic. Psychiatric: She has a normal mood and affect. Her speech is normal and behavior is normal. Judgment and thought content normal. Cognition and memory are normal.   Nursing note and vitals reviewed.         Diagnostic Study Results     Labs -  Recent Results (from the past 12 hour(s))   DRUG SCREEN, URINE    Collection Time: 02/21/18  3:15 PM   Result Value Ref Range    BENZODIAZEPINES POSITIVE (A) NEG      BARBITURATES NEGATIVE  NEG      THC (TH-CANNABINOL) NEGATIVE  NEG      OPIATES NEGATIVE  NEG      PCP(PHENCYCLIDINE) NEGATIVE  NEG      COCAINE NEGATIVE  NEG      AMPHETAMINES NEGATIVE  NEG      METHADONE NEGATIVE  NEG      HDSCOM (NOTE)    CBC WITH AUTOMATED DIFF    Collection Time: 02/21/18  3:25 PM   Result Value Ref Range    WBC 8.7 4.6 - 13.2 K/uL    RBC 4.98 4.20 - 5.30 M/uL    HGB 14.4 12.0 - 16.0 g/dL    HCT 43.6 35.0 - 45.0 %    MCV 87.6 74.0 - 97.0 FL    MCH 28.9 24.0 - 34.0 PG    MCHC 33.0 31.0 - 37.0 g/dL    RDW 17.8 (H) 11.6 - 14.5 %    PLATELET 856 (H) 972 - 420 K/uL    MPV 10.4 9.2 - 11.8 FL    NEUTROPHILS 56 40 - 73 %    LYMPHOCYTES 34 21 - 52 %    MONOCYTES 8 3 - 10 %    EOSINOPHILS 2 0 - 5 %    BASOPHILS 0 0 - 2 %    ABS. NEUTROPHILS 4.9 1.8 - 8.0 K/UL    ABS. LYMPHOCYTES 3.0 0.9 - 3.6 K/UL    ABS. MONOCYTES 0.7 0.05 - 1.2 K/UL    ABS. EOSINOPHILS 0.2 0.0 - 0.4 K/UL    ABS. BASOPHILS 0.0 0.0 - 0.1 K/UL    DF AUTOMATED     METABOLIC PANEL, COMPREHENSIVE    Collection Time: 02/21/18  3:25 PM   Result Value Ref Range    Sodium 140 136 - 145 mmol/L    Potassium 4.1 3.5 - 5.5 mmol/L    Chloride 109 (H) 100 - 108 mmol/L    CO2 24 21 - 32 mmol/L    Anion gap 7 3.0 - 18 mmol/L    Glucose 84 74 - 99 mg/dL    BUN 12 7.0 - 18 MG/DL    Creatinine 0.89 0.6 - 1.3 MG/DL    BUN/Creatinine ratio 13 12 - 20      GFR est AA >60 >60 ml/min/1.73m2    GFR est non-AA >60 >60 ml/min/1.73m2    Calcium 9.4 8.5 - 10.1 MG/DL    Bilirubin, total 0.2 0.2 - 1.0 MG/DL    ALT (SGPT) 23 13 - 56 U/L    AST (SGOT) 18 15 - 37 U/L    Alk. phosphatase 135 (H) 45 - 117 U/L    Protein, total 7.1 6.4 - 8.2 g/dL    Albumin 3.5 3.4 - 5.0 g/dL    Globulin 3.6 2.0 - 4.0 g/dL    A-G Ratio 1.0 0.8 - 1.7     LIPASE    Collection Time: 02/21/18  3:25 PM   Result Value Ref Range    Lipase 335 73 - 393 U/L   ETHYL ALCOHOL    Collection Time: 02/21/18  3:25 PM   Result Value Ref Range    ALCOHOL(ETHYL),SERUM <3 0 - 3 MG/DL       Radiologic Studies -   No orders to display         Medical Decision Making   I am the first provider for this patient. I reviewed the vital signs, available nursing notes, past medical history, past surgical history, family history and social history. Vital Signs-Reviewed the patient's vital signs.     Records Reviewed: Nursing Notes and Old Medical Records (Time of Review: 3:37 PM)    ED Course: Progress Notes, Reevaluation, and Consults:  Labs essentially normal. USD positive of benzo. ETOH negative. 3:37 PM 2/21/2018    4:58 PM, 2/21/2018   Consult:  Discussed care with Babita Mendez with Crisis. Standard discussion; including history of patients chief complaint, available diagnostic results, and treatment course. Will come down to revaluate the pt.     6:53 PM, 2/21/2018   Consult:  Discussed care with Babita Mendez with crisis. Standard discussion; including history of patients chief complaint, available diagnostic results, and treatment course. States the pt is ok to be discharged home. Provider Notes (Medical Decision Making):  MDM  Number of Diagnoses or Management Options  Abdominal pain, generalized:   Passive suicidal ideations:       Diagnosis       I have reassessed the patient. Patient is feeling better after treatment in the ED. Recommends that the pt keeps her appointment with Dr. DE LOS SANTOS [facility] [place] 2/28/2018. Patient was discharged in stable condition. Patient is to return to emergency department if any new or worsening condition. Clinical Impression:   1. Passive suicidal ideations    2. Abdominal pain, generalized        Disposition: D/C home    Follow-up Information     Follow up With Details Comments Tray Martin MD Call in 2 days [facility]. [facility]      Mindy Brown MD Go on 2/28/2018 Keep appointment.  Cierra Espinozakonstantin [address] [facility] EMERGENCY DEPT  If symptoms worsen, As needed [address]  [phone]           _______________________________    Attestations:  Scribe Attestation     Foreign Dee acting as a scribe for and in the presence of Kayley Gonzalez DO      February 21, 2018 at 3:37 PM       Provider Attestation:      I personally performed the services described in the documentation, reviewed the documentation, as recorded by the scribe in my presence, and it accurately and completely records my words and actions.  February 21, 2018 at 3:37 PM - Kayley Gonzalez,     _______________________________

## 2018-02-21 NOTE — TELEPHONE ENCOUNTER
Received call from patient stating that she was having abdominal pains ; patient states that she does not have medical coverage when advised to seek emergent medical care for acute issues ( new abdominal pain) ; when advised that we do not treat for acute issues, patient then becomes beligerent and starts to use profanity; patient advised again that we do not treat for acute issues and that she should proceed to emergency dept for treatment.

## 2018-02-21 NOTE — ED TRIAGE NOTES
EMS reports abd pain with diarrhea x 2 weeks;no pain medication x 2-3 days; pt withdrawing;SI with plan

## 2018-02-21 NOTE — TELEPHONE ENCOUNTER
Front desk received phone call from patients brother stating his sister who was a patient of our clinic was having extreme pain and was suicidal. Encouraged brother to call 911 immediately for help. Brother states she will not go. I called patient and spoke with her, she states she is having severe stomach pain . States she feels as if her stomach is twisted and tight. When I asked patient why she did not show up for her last appointment she said her insurance told her she would not be covered because she was changing insurance from [place] to [address] [place] [address] and they told her our Doctor Buzz Dobson did not accept that insurance and she had no way to pay for the visit. Explained to patient she can not be denied due to lack of insurance. Provider R. Darcia Sandhoff was made aware of circumstances and recommended she be seen in ER . Explained to patient this could be very serious and she needed to get help immediately. Patient was crying and stated if she could just get some pain pills for the severe pain. Explained to patient we could not just prescribe pain medications for acute pain. Patient then said she would rather die than go to \"[facility]\" and she could not pay. I encouraged her again to go to hospital this was very serious. Patient was worried how she would get back home once there. I told patient not to worry with that at this time but to seek help. Patient stated she had to take a shower and ended the call. I immediately dialed [phone] and ask for police and medical help to go to patients home that she was suicidal. Practice Administrator aware of situation. Unable to reach risk management at this time.

## 2018-02-21 NOTE — ED NOTES
Pt changed to blue paper scrubs and red socks. Clothes, shoes, belongings and purse in [address] [facility].   Alondra Krishna RN

## 2018-02-22 ENCOUNTER — TELEPHONE (OUTPATIENT)
Dept: PAIN MANAGEMENT | Age: 63
End: 2018-02-22

## 2018-02-22 ENCOUNTER — PATIENT OUTREACH (OUTPATIENT)
Dept: INTERNAL MEDICINE CLINIC | Age: 63
End: 2018-02-22

## 2018-02-22 NOTE — PROGRESS NOTES
[address] [facility]  ED Follow-up:  Nurse Navigator attempted to contact patient post discharge from [facility]; 2/21/2018 to 2/21/2018. Left message for the patient to contact the office on the answering machine.      Patient presenting symptoms:   Abdominal pain  Passive suciudal ideation

## 2018-02-22 NOTE — DISCHARGE INSTRUCTIONS
Abdominal Pain: Care Instructions  Your Care Instructions    Abdominal pain has many possible causes. Some aren't serious and get better on their own in a few days. Others need more testing and treatment. If your pain continues or gets worse, you need to be rechecked and may need more tests to find out what is wrong. You may need surgery to correct the problem. Don't ignore new symptoms, such as fever, nausea and vomiting, urination problems, pain that gets worse, and dizziness. These may be signs of a more serious problem. Your doctor may have recommended a follow-up visit in the next 8 to 12 hours. If you are not getting better, you may need more tests or treatment. The doctor has checked you carefully, but problems can develop later. If you notice any problems or new symptoms, get medical treatment right away. Follow-up care is a key part of your treatment and safety. Be sure to make and go to all appointments, and call your doctor if you are having problems. It's also a good idea to know your test results and keep a list of the medicines you take. How can you care for yourself at home? · Rest until you feel better. · To prevent dehydration, drink plenty of fluids, enough so that your urine is light yellow or clear like water. Choose water and other caffeine-free clear liquids until you feel better. If you have kidney, heart, or liver disease and have to limit fluids, talk with your doctor before you increase the amount of fluids you drink. · If your stomach is upset, eat mild foods, such as rice, dry toast or crackers, bananas, and applesauce. Try eating several small meals instead of two or three large ones. · Wait until 48 hours after all symptoms have gone away before you have spicy foods, alcohol, and drinks that contain caffeine. · Do not eat foods that are high in fat. · Avoid anti-inflammatory medicines such as aspirin, ibuprofen (Advil, Motrin), and naproxen (Aleve).  These can cause stomach upset. Talk to your doctor if you take daily aspirin for another health problem. When should you call for help? Call 911 anytime you think you may need emergency care. For example, call if:  ? · You passed out (lost consciousness). ? · You pass maroon or very bloody stools. ? · You vomit blood or what looks like coffee grounds. ? · You have new, severe belly pain. ?Call your doctor now or seek immediate medical care if:  ? · Your pain gets worse, especially if it becomes focused in one area of your belly. ? · You have a new or higher fever. ? · Your stools are black and look like tar, or they have streaks of blood. ? · You have unexpected vaginal bleeding. ? · You have symptoms of a urinary tract infection. These may include:  ¨ Pain when you urinate. ¨ Urinating more often than usual.  ¨ Blood in your urine. ? · You are dizzy or lightheaded, or you feel like you may faint. ? Watch closely for changes in your health, and be sure to contact your doctor if:  ? · You are not getting better after 1 day (24 hours). Where can you learn more? Go to http://nancy-isabel.info/. Enter O766 in the search box to learn more about \"Abdominal Pain: Care Instructions. \"  Current as of: March 20, 2017  Content Version: 11.4  © 4805-8390 [employer]. Care instructions adapted under license by [employer] (which disclaims liability or warranty for this information). If you have questions about a medical condition or this instruction, always ask your healthcare professional. [place] [address] any warranty or liability for your use of this information. Suicidal Thoughts and Behavior: Care Instructions  Your Care Instructions    You have been seen by a doctor because you've had thoughts about killing yourself. Maybe you have tried to do it.  This is much different from having fleeting thoughts of death, which many people have from time to time. Your doctor and support team will work hard to help keep you safe. Your team may include a , a , and a counselor. Most people who think about suicide don't want to die. They think suicide will end their problems and pain. People who consider suicide often feel hopeless, helpless, and worthless. These thoughts can make a person feel that there is no other choice. But you do have a choice. Help is always available. The doctor and staff members are taking you and your pain very seriously. It is important to remember that there are people who are willing and able to talk with you about your suicidal thoughts. Treatment and close follow-up care can help you feel better about life. Thoughts of hopelessness and suicide may come from being depressed. Depression is a medical condition. When you have depression, there may be problems with activity levels in certain parts of your brain. Chemicals in your brain called neurotransmitters may be out of balance. But depression can be treated. Treatment for depression includes counseling, medicines, and lifestyle changes. With treatment, you can feel better. Your doctor doesn't want you to hurt yourself. He or she may ask you to sign a \"no harm\" agreement or contract. This contract is your promise that you will not hurt yourself between now and your next visit. Be completely honest with your doctor if you feel that you can't sign it. You will get help. Follow-up care is a key part of your treatment and safety. Be sure to make and go to all appointments, and call your doctor if you are having problems. It's also a good idea to know your test results and keep a list of the medicines you take. How can you care for yourself at home? · Talk to someone. Reach out to family members, friends, your doctor, or a counselor. Be open and honest with them about your thoughts and feelings. · Be safe with medicines. Take your medicines exactly as prescribed. Call your doctor if you think you are having a problem with your medicine. · Avoid illegal drugs and alcohol. · Attend all counseling sessions recommended by your doctor. · Have someone take away sharp or dangerous objects, guns, and drugs from your home. · Keep the numbers for these national suicide hotlines: 2-567-833-TALK (0-929.938.1144) and 1-497-MSAFKWI (7-216.419.5802). When should you call for help? Call 911 anytime you think you may need emergency care. For example, call if:  ? · You feel you cannot stop from hurting yourself or someone else. ?Call your doctor now or seek immediate medical care if:  ? · You have one or more warning signs of suicide. For example, call if:  ¨ You feel like giving away your possessions. ¨ You use illegal drugs or drink alcohol heavily. ¨ You talk or write about death. This may include writing suicide notes and talking about guns, knives, or pills. ¨ You start to spend a lot of time alone or spend more time alone than usual.   ? · You hear voices. ? · You start acting in an aggressive way that's not normal for you. ? Watch closely for changes in your health, and be sure to contact your doctor if you have any problems. Where can you learn more? Go to http://nancy-isabel.info/. Enter B968 in the search box to learn more about \"Suicidal Thoughts and Behavior: Care Instructions. \"  Current as of: May 12, 2017  Content Version: 11.4  © 6611-7458 [employer]. Care instructions adapted under license by [employer] (which disclaims liability or warranty for this information). If you have questions about a medical condition or this instruction, always ask your healthcare professional. [address] any warranty or liability for your use of this information.

## 2018-02-23 NOTE — TELEPHONE ENCOUNTER
Spoke with patient ; patient states that she is not having any withdrawals ; patient asked again to clarify if she is having any withdrawals; patient again plainly states that she is not experiencing any withdrawal symptoms; patient continually asks for however \"pain pills\"; patient advised that there is no one in office at present to utter a prescription and that her case is under further review. Patient also advised to proceed to emergency dept if circumstances warrant for any situation.

## 2018-02-23 NOTE — TELEPHONE ENCOUNTER
No appointments available at this time.  Please offer withdrawal cocktail  or emergency room if needed.  Case under further review (Routing comment) /rc

## 2018-02-26 ENCOUNTER — PATIENT OUTREACH (OUTPATIENT)
Dept: INTERNAL MEDICINE CLINIC | Age: 63
End: 2018-02-26

## 2018-02-26 NOTE — PROGRESS NOTES
Nurse Navigator second attempt to contact patient post discharge from [facility]; 2/21/2018 to 2/21/2018. Left message for the patient to contact the office on the answering machine, letter sent.

## 2018-02-26 NOTE — LETTER
2/26/2018 4:14 PM 
 
Ms. Cruz Nelson [address] 
[address] [facility] I am a Nurse Navigator working with your physician's office. Part of my job is to follow up with patients who have been in the emergency department or hospital to see how they are feeling, answer any questions they may have about their visit and also make sure they have a follow-up appointment to see their primary care doctor. I can also provide resources to patients that have other needs. Please call me if you need assistance with affording food, medications, or if you need transportation to physician appointments. I can be reached Monday thru Friday at the telephone number listed above. Thank you for allowing us to participate in your care!  
 
 
 
 
Sincerely, 
 
 
Joann Reeder RN

## 2018-03-06 ENCOUNTER — DOCUMENTATION ONLY (OUTPATIENT)
Dept: PAIN MANAGEMENT | Age: 63
End: 2018-03-06

## 2018-03-06 NOTE — PROGRESS NOTES
Our office received a call from a relative of Ms. Katalina Metzger who reported that she was \"suicidal.\" A staff member then contacted Ms Katalina Metzger who was in severe stomach pain. She was advised to go to the ER for further evaluation and she reported to the staff member that she would rather die than go to the ER. 911 was called and she was taken to the ER. Please see previous notes regarding this matter. This case has been under review with practice management, risk management, and supervising physician. A 60 day hold on her treatment has been recommended until she can obtain clearance from psychiatry stating that she is stable to continue with opioid medication. Her case will be reevaluated at that time.

## 2018-03-19 ENCOUNTER — DOCUMENTATION ONLY (OUTPATIENT)
Dept: PAIN MANAGEMENT | Age: 63
End: 2018-03-19

## 2018-03-19 NOTE — PROGRESS NOTES
Attempted to contact patient regarding cancellation of appt on 03/20/18 ; no answer noted at number given; vm left by clinical supv.

## 2018-04-26 ENCOUNTER — TELEPHONE (OUTPATIENT)
Dept: PAIN MANAGEMENT | Age: 63
End: 2018-04-26

## 2018-04-26 NOTE — TELEPHONE ENCOUNTER
Returned patient's call. Patient asking if she could be present in the upcoming Multidisciplinary Meeting in May. Explained to patient that is not allowed HIPPA violation, because we may discuss other concerns and patient issues. Patient states an understanding. I will call patient after meeting and let her know the outcome of her situation.

## 2018-05-25 ENCOUNTER — OFFICE VISIT (OUTPATIENT)
Dept: PAIN MANAGEMENT | Age: 63
End: 2018-05-25

## 2018-05-25 VITALS
RESPIRATION RATE: 14 BRPM | BODY MASS INDEX: 20.61 KG/M2 | TEMPERATURE: 96.7 F | DIASTOLIC BLOOD PRESSURE: 78 MMHG | HEIGHT: 62 IN | SYSTOLIC BLOOD PRESSURE: 157 MMHG | HEART RATE: 77 BPM | WEIGHT: 112 LBS

## 2018-05-25 DIAGNOSIS — M48.061 SPINAL STENOSIS OF LUMBAR REGION, UNSPECIFIED WHETHER NEUROGENIC CLAUDICATION PRESENT: ICD-10-CM

## 2018-05-25 DIAGNOSIS — M47.812 SPONDYLOSIS OF CERVICAL REGION WITHOUT MYELOPATHY OR RADICULOPATHY: ICD-10-CM

## 2018-05-25 DIAGNOSIS — Z79.899 ENCOUNTER FOR LONG-TERM (CURRENT) USE OF HIGH-RISK MEDICATION: Primary | ICD-10-CM

## 2018-05-25 DIAGNOSIS — G89.4 CHRONIC PAIN SYNDROME: ICD-10-CM

## 2018-05-25 LAB
ALCOHOL UR POC: NORMAL
AMPHETAMINES UR POC: NORMAL
BARBITURATES UR POC: NORMAL
BENZODIAZEPINES UR POC: NORMAL
BUPRENORPHINE UR POC: NEGATIVE
CANNABINOIDS UR POC: NEGATIVE
CARISOPRODOL UR POC: NORMAL
COCAINE UR POC: NEGATIVE
FENTANYL UR POC: NORMAL
MDMA/ECSTASY UR POC: NORMAL
METHADONE UR POC: NEGATIVE
METHAMPHETAMINE UR POC: NORMAL
METHYLPHENIDATE UR POC: NORMAL
OPIATES UR POC: NEGATIVE
OXYCODONE UR POC: NEGATIVE
PHENCYCLIDINE UR POC: NORMAL
PROPOXYPHENE UR POC: NORMAL
TRAMADOL UR POC: NORMAL
TRICYCLICS UR POC: NORMAL

## 2018-05-25 RX ORDER — DEXTROAMPHETAMINE SACCHARATE, AMPHETAMINE ASPARTATE, DEXTROAMPHETAMINE SULFATE AND AMPHETAMINE SULFATE 2.5; 2.5; 2.5; 2.5 MG/1; MG/1; MG/1; MG/1
10 TABLET ORAL 2 TIMES DAILY
COMMUNITY

## 2018-05-25 RX ORDER — PANTOPRAZOLE SODIUM 40 MG/1
40 TABLET, DELAYED RELEASE ORAL DAILY
COMMUNITY

## 2018-05-25 RX ORDER — ACETAMINOPHEN 500 MG
500 TABLET ORAL
Qty: 180 TAB | Refills: 2 | Status: SHIPPED | OUTPATIENT
Start: 2018-05-25 | End: 2018-06-24

## 2018-05-25 RX ORDER — TIZANIDINE 4 MG/1
4 TABLET ORAL
COMMUNITY

## 2018-05-25 RX ORDER — CETIRIZINE HCL 10 MG
10 TABLET ORAL
COMMUNITY

## 2018-05-25 NOTE — PATIENT INSTRUCTIONS
Learning About Benefits From Quitting Smoking  How does quitting smoking make you healthier? If you're thinking about quitting smoking, you may have a few reasons to be smoke-free. Your health may be one of them. · When you quit smoking, you lower your risks for cancer, lung disease, heart attack, stroke, blood vessel disease, and blindness from macular degeneration. · When you're smoke-free, you get sick less often, and you heal faster. You are less likely to get colds, flu, bronchitis, and pneumonia. · As a nonsmoker, you may find that your mood is better and you are less stressed. When and how will you feel healthier? Quitting has real health benefits that start from day 1 of being smoke-free. And the longer you stay smoke-free, the healthier you get and the better you feel. The first hours  · After just 20 minutes, your blood pressure and heart rate go down. That means there's less stress on your heart and blood vessels. · Within 12 hours, the level of carbon monoxide in your blood drops back to normal. That makes room for more oxygen. With more oxygen in your body, you may notice that you have more energy than when you smoked. After 2 weeks  · Your lungs start to work better. · Your risk of heart attack starts to drop. After 1 month  · When your lungs are clear, you cough less and breathe deeper, so it's easier to be active. · Your sense of taste and smell return. That means you can enjoy food more than you have since you started smoking. Over the years  · After 1 year, your risk of heart disease is half what it would be if you kept smoking. · After 5 years, your risk of stroke starts to shrink. Within a few years after that, it's about the same as if you'd never smoked. · After 10 years, your risk of dying from lung cancer is cut by about half. And your risk for many other types of cancer is lower too. How would quitting help others in your life?   When you quit smoking, you improve the health of everyone who now breathes in your smoke. · Their heart, lung, and cancer risks drop, much like yours. · They are sick less. For babies and small children, living smoke-free means they're less likely to have ear infections, pneumonia, and bronchitis. · If you're a woman who is or will be pregnant someday, quitting smoking means a healthier . · Children who are close to you are less likely to become adult smokers. Where can you learn more? Go to http://nancy-isabel.info/. Enter [id number] [id number] [id number] in the search box to learn more about \"Learning About Benefits From Quitting Smoking. \"  Current as of: 2017  Content Version: 11.4  © 6878-5003 [employer]. Care instructions adapted under license by [employer] (which disclaims liability or warranty for this information). If you have questions about a medical condition or this instruction, always ask your healthcare professional. [place] [address] any warranty or liability for your use of this information. Learning About Sleeping Well  What does sleeping well mean? Sleeping well means getting enough sleep. How much sleep is enough varies among people. The number of hours you sleep is not as important as how you feel when you wake up. If you do not feel refreshed, you probably need more sleep. Another sign of not getting enough sleep is feeling tired during the day. The average total nightly sleep time is 7½ to 8 hours. Healthy adults may need a little more or a little less than this. Why is getting enough sleep important? Getting enough quality sleep is a basic part of good health. When your sleep suffers, your mood and your thoughts can suffer too. You may find yourself feeling more grumpy or stressed. Not getting enough sleep also can lead to serious problems, including injury, accidents, anxiety, and depression. What might cause poor sleeping?   Many things can cause sleep problems, including:  · Stress. Stress can be caused by fear about a single event, such as giving a speech. Or you may have ongoing stress, such as worry about work or school. · Depression, anxiety, and other mental or emotional conditions. · Changes in your sleep habits or surroundings. This includes changes that happen where you sleep, such as noise, light, or sleeping in a different bed. It also includes changes in your sleep pattern, such as having jet lag or working a late shift. · Health problems, such as pain, breathing problems, and restless legs syndrome. · Lack of regular exercise. How can you help yourself? Here are some tips that may help you sleep more soundly and wake up feeling more refreshed. Your sleeping area  · Use your bedroom only for sleeping and sex. A bit of light reading may help you fall asleep. But if it doesn't, do your reading elsewhere in the house. Don't watch TV in bed. · Be sure your bed is big enough to stretch out comfortably, especially if you have a sleep partner. · Keep your bedroom quiet, dark, and cool. Use curtains, blinds, or a sleep mask to block out light. To block out noise, use earplugs, soothing music, or a \"white noise\" machine. Your evening and bedtime routine  · Create a relaxing bedtime routine. You might want to take a warm shower or bath, listen to soothing music, or drink a cup of noncaffeinated tea. · Go to bed at the same time every night. And get up at the same time every morning, even if you feel tired. What to avoid  · Limit caffeine (coffee, tea, caffeinated sodas) during the day, and don't have any for at least 4 to 6 hours before bedtime. · Don't drink alcohol before bedtime. Alcohol can cause you to wake up more often during the night. · Don't smoke or use tobacco, especially in the evening. Nicotine can keep you awake. · Don't take naps during the day, especially close to bedtime. · Don't lie in bed awake for too long.  If you can't fall asleep, or if you wake up in the middle of the night and can't get back to sleep within 15 minutes or so, get out of bed and go to another room until you feel sleepy. · Don't take medicine right before bed that may keep you awake or make you feel hyper or energized. Your doctor can tell you if your medicine may do this and if you can take it earlier in the day. If you can't sleep  · Imagine yourself in a peaceful, pleasant scene. Focus on the details and feelings of being in a place that is relaxing. · Get up and do a quiet or boring activity until you feel sleepy. · Don't drink any liquids after 6 p.m. if you wake up often because you have to go to the bathroom. Where can you learn more? Go to http://nancy-isabel.info/. Enter I936 in the search box to learn more about \"Learning About Sleeping Well. \"  Current as of: May 12, 2017  Content Version: 11.4  © 5719-7449 [employer]wise[employer] Incorporated. Care instructions adapted under license by [employer] (which disclaims liability or warranty for this information). If you have questions about a medical condition or this instruction, always ask your healthcare professional. [address] any warranty or liability for your use of this information.

## 2018-05-25 NOTE — PROGRESS NOTES
Referral date July 2013, source PCP for chronic lumbago. HPI:  Brian Liz is a 59 y.o. female here for f/u visit for ongoing evaluation of chronic low back pain and chronic neck pain. Pt was last seen here on 11/21/2017. Pt denies interval changes on the character or distribution of pain. Pain is located to posterior neck and midline lumbar that radiates to right knee . The pain is described as sharp 8/10 . The pain is worsened by standing, sitting and walking. Symptoms are relieved by heat, TENS unit use, previous injections (provdided neck pain relief for 8 months, no relief to lower back) and leaning forward. Patient denies current suicidal ideation or homicidal ideation. Social History     Social History    Marital status: SINGLE     Spouse name: N/A    Number of children: N/A    Years of education: N/A     Occupational History    Not on file.      Social History Main Topics    Smoking status: Current Every Day Smoker     Packs/day: 1.00    Smokeless tobacco: Never Used    Alcohol use No      Comment: hx of alcohol abuse in 2000    Drug use: No      Comment: hx of cocaine 2008    Sexual activity: No     Other Topics Concern    Not on file     Social History Narrative     Family History   Problem Relation Age of Onset    Heart Attack Mother     Heart Disease Mother     Cancer Father     Heart Attack Father     Heart Disease Father     Stroke Father     Cancer Sister     Diabetes Sister     Hypertension Sister      Allergies   Allergen Reactions    Pcn [Penicillins] Swelling   topamax  Causes looney toons    Past Medical History:   Diagnosis Date    Back injury     Bipolar 1 disorder ([address]     COPD (chronic obstructive pulmonary disease) ([address]     Depression     Headache(784.0)     Heartburn     Kidney stone     Mental disorder     Microhematuria     Neck injury     Nervous disorder     Tremor     Unspecified cerebral artery occlusion with cerebral infarction     UTI (lower urinary tract infection)      Past Surgical History:   Procedure Laterality Date    HX OTHER SURGICAL      mass removed from breast 5-2011     Current Outpatient Prescriptions on File Prior to Visit   Medication Sig    lurasidone (LATUDA) 20 mg tab tablet Take 30 mg by mouth daily (with breakfast).  sucralfate (CARAFATE) 1 gram tablet Take 1 g by mouth two (2) times a day.  ergocalciferol (ERGOCALCIFEROL) 50,000 unit capsule Take 50,000 Units by mouth every seven (7) days.  primidone (MYSOLINE) 50 mg tablet Take 50 mg by mouth three (3) times daily.  aspirin delayed-release 81 mg tablet Take 81 mg by mouth daily.  diazepam (VALIUM) 5 mg tablet Take 5 mg by mouth every eight (8) hours as needed for Anxiety.  naloxone 4 mg/actuation spry 4 mg by Nasal route as needed. For emergency use only  Indications: OPIATE-INDUCED RESPIRATORY DEPRESSION    Cane jus Use as directed to assist with ambulation     No current facility-administered medications on file prior to visit. ROS:  Review of Systems   Constitutional: Positive for chills (not new) and diaphoresis (not new). Negative for fever. Respiratory: Negative for shortness of breath. Cardiovascular: Negative for chest pain. Gastrointestinal: Positive for nausea (not new). Negative for constipation, diarrhea and vomiting. Musculoskeletal: Positive for back pain and neck pain. Neurological: Positive for headaches. Psychiatric/Behavioral: Positive for depression. Negative for suicidal ideas. The patient is nervous/anxious. Opioid specific risk:  COPD, depression,, anxiety, previous suicidal ideations, concurrent benzodiazapine use, GERD, smoking, ADHD    Vitals:    05/25/18 0745   BP: 157/78   Pulse: 77   Resp: 14   Temp: 96.7 °F (35.9 °C)   TempSrc: Oral   Weight: 50.8 kg (112 lb)   Height: 5' 2\" (1.575 m)   PainSc:   8   PainLoc: Back        PE:  AFVSS except HTN, no acute distress, normal body habitus.  A&OXs 3.  normocephalic, atraumatic. Conjugate gaze, clear sclerae. Speech clear and appropriate. Strength for right upper extremity is 5/5 for shoulder abduction, elbow flexion, wrist extension, elbow extension, finger abduction, flexor digitorum profundus to digits 2 through 5. Strength for left upper extremity is 5/5 for shoulder abduction, elbow flexion, wrist extension, elbow extension, finger abduction, flexor digitorum profundus to digits 2 through 5. Strength for right lower extremity is 5/5 for hip flexion, knee extension, dorsiflexion, extensor hallucis longus, plantar flexion. Strength for left lower extremity is 5/5 for hip flexion, knee extension, dorsiflexion, extensor hallucis longus, plantar flexion. Muscle stretch reflexes for right upper extremity are 2+ for C5, C6, C7. Muscle stretch reflexes for left upper extremity are 2+ for C5, C6, C7. Muscle Stretch reflexes for right lower extremity are 2+ for L4,  S1.   Muscle Stretch reflexes for left  lower extremity are 2+ for L4, S1.   Negative ankle clonus on the right and the left. Downgoing plantar response on the right and the left. Negative Hoffmans on the right and the left. MSK:   Negative seated and supine straight leg raise bilaterally. Negative FABERs on the right and the left. Negative Stinchfield's on the right and a left. Negative FADIRS on the right and the left. Gait is within functional limits. Balance is within functional limits but easily perturbed. Sensation to light touch is intact for left C4-T1 and right C4-T1 and right L2-S2 and left L2-S2. Decreased lumbar flexion with improvement of primary pain. Decreased lumbar extension with worsening of primary pain. Right SI joint TTP. Full AROM cervical spine all planes . Spurling test positive to left (with tingling into brachium stopping at the elbow)and negative right. Tinnel positive right ulnar negative left elbow and bilat wrists.  .    Calculated MEQ - 0  Naloxone rescue - yes  Prophylactic bowel program - no  Date of last OCA 11/20/2017  Last UDS today, consistent +benzos and +adderall, -opoids  , date checked today, findings consistent    Primary Care Physician  [facility] [address] [address]  [address]  [phone]      ORT - 6  PGIC - 5 & 5  Oswestry - 50%  COMM- 9    PHQ -- . PHQ over the last two weeks 5/25/2018   PHQ Not Done Active Diagnosis of Depression or Bipolar Disorder   Little interest or pleasure in doing things -   Feeling down, depressed or hopeless -   Total Score PHQ 2 -       Assessment/Plan:     ICD-10-CM ICD-9-CM    1. Encounter for long-term (current) use of high-risk medication Z79.899 V58.69 DRUG SCREEN      AMB POC DRUG SCREEN ()   2. Chronic pain syndrome G89.4 338.4 acetaminophen (TYLENOL) 500 mg tablet   3. Spondylosis of cervical region without myelopathy or radiculopathy M47.812 721.0 acetaminophen (TYLENOL) 500 mg tablet   4. Spinal stenosis of lumbar region, unspecified whether neurogenic claudication present M48.061 724.02         Patient is also been on official opioid hold secondary to suicidal ideation earlier. She treats with mental health provider who provides her with benzodiazepine but has cleared her for resumption of long-term opioids as per our discretion. However, I do not recommend long-term opioid therapy for this patient. She has multiple risk factors including history of suicidal ideation, multiple mental health diagnoses, history of multiple substance misuse, history of reported accidental overdose. We will continue to develop complementary and integrative plan of care for her chronic pain that does not involve opioid medications. Patient to reestablish follow-up with Dr. Ignacio Ortiz for consideration of injection pain therapies. Patient with history of carpal tunnel syndrome and continues to use bilateral night splints.   Patient also with right ulnar neuropathy at the elbow sleep modification techniques were instructed to her today. Patient was instructed to independently investigate yoga for persons with chronic pain. She was also instructed to independently investigate an anti-inflammatory diet. She will follow-up in approximately 8 weeks at that time assessment will be ongoing and we will consider acupuncture and pain psychology at that time. Continue Tylenol and Zanaflex as needed. GOALS:  To establish complementary and integrative plan of care to address chronic pain issues while minimizing pharmaceuticals to maximize patient's function improve quality of life. Education:  Patient again educated on the importance of strict compliance with the opioid care agreement while on opioid therapy. Patient also again educated that they should avoid driving while on chronic opioid therapy. Also advised to avoid alcohol and to avoid benzodiazepines while on opioid therapy. Patient Homework:  Continue to independently investigate yoga for persons with chronic pain. Initiate investigation of anti-inflammatory diet. Continue TENS unit at home. F/u:. Follow-up Disposition:  Return in about 2 months (around 7/25/2018) for 30 min.

## 2018-05-25 NOTE — MR AVS SNAPSHOT
[address] [facility] [address] 
[phone] Patient: Issa Mueller MRN: [id number] ULO:[birth year] Visit Information Date & Time Provider Department Dept. Phone Encounter #  
 5/25/2018  8:00 AM Hallie Gonzales [address] [place] [address] for Pain Management [id number] Follow-up Instructions Return in about 2 months (around 7/25/2018) for 30 min. Your Appointments 5/31/2018 11:30 AM  
CONSULT with Frances Lindquist MD  
[address] [place] [address] for Pain Management [address]) Appt Note: Consult requested by Dr. Mercy Clark to be re-evaluated. Ruy Ricks [address] [facility] [address] [phone] [facility] [id number] Upcoming Health Maintenance Date Due Hepatitis C Screening 1955 Pneumococcal 19-64 Medium Risk (1 of 1 - PPSV23) 5/4/1975 DTaP/Tdap/Td series (1 - Tdap) 5/4/1977 PAP AKA CERVICAL CYTOLOGY 5/4/1977 BREAST CANCER SCRN MAMMOGRAM 5/4/2006 FOBT Q 1 YEAR AGE 50-75 5/4/2006 ZOSTER VACCINE AGE 60> 3/4/2016 MEDICARE YEARLY EXAM 3/14/2018 Influenza Age 5 to Adult 8/1/2018 Allergies as of 5/25/2018  Review Complete On: 5/25/2018 By: Hallie Gonzales DO Severity Noted Reaction Type Reactions Pcn [Penicillins]  07/09/2013    Swelling Current Immunizations  Never Reviewed No immunizations on file. Not reviewed this visit You Were Diagnosed With   
  
 Codes Comments Encounter for long-term (current) use of high-risk medication    -  Primary ICD-10-CM: F03.049 ICD-9-CM: V58.69 Chronic pain syndrome     ICD-10-CM: G89.4 ICD-9-CM: 338.4 Spondylosis of cervical region without myelopathy or radiculopathy     ICD-10-CM: M47.812 ICD-9-CM: 721.0 Spinal stenosis of lumbar region, unspecified whether neurogenic claudication present     ICD-10-CM: M48.061 
ICD-9-CM: 724.02 Vitals BP Pulse Temp Resp Height(growth percentile) Weight(growth percentile) 157/78 (BP 1 Location: Left arm, BP Patient Position: Sitting) 77 96.7 °F (35.9 °C) (Oral) 14 5' 2\" (1.575 m) 112 lb (50.8 kg) BMI OB Status Smoking Status 20.49 kg/m2 Postmenopausal Current Every Day Smoker Vitals History BMI and BSA Data Body Mass Index Body Surface Area  
 20.49 kg/m 2 1.49 m 2 Preferred Pharmacy Pharmacy Name Phone [address] [place] [address] [phone] Your Updated Medication List  
  
   
This list is accurate as of 5/25/18  9:56 AM.  Always use your most recent med list.  
  
  
  
  
 acetaminophen 500 mg tablet Commonly known as:  TYLENOL Take 1 Tab by mouth three (3) times daily as needed for Pain for up to 30 days. Indications: BACK PAIN, Pain ADDERALL 10 mg tablet Generic drug:  dextroamphetamine-amphetamine Take 10 mg by mouth two (2) times a dayIndications: Attention-Deficit Hyperactivity Disorder. aspirin delayed-release 81 mg tablet Take 81 mg by mouth daily. Gilbert Chalk Use as directed to assist with ambulation  
  
 cetirizine 10 mg tablet Commonly known as:  ZYRTEC Take 10 mg by mouth daily as needed for Allergies. ergocalciferol 50,000 unit capsule Commonly known as:  ERGOCALCIFEROL Take 50,000 Units by mouth every seven (7) days. fish oil-omega-3 fatty acids 340-1,000 mg capsule Take 1 Cap by mouth daily. LATUDA 20 mg Tab tablet Generic drug:  lurasidone Take 30 mg by mouth daily (with breakfast). naloxone 4 mg/actuation nasal spray Commonly known as:  NARCAN  
4 mg by Nasal route as needed. For emergency use only  Indications: OPIATE-INDUCED RESPIRATORY DEPRESSION  
  
 primidone 50 mg tablet Commonly known as: MYSOLINE Take 50 mg by mouth three (3) times daily. PROTONIX 40 mg tablet Generic drug:  pantoprazole Take 40 mg by mouth daily. sucralfate 1 gram tablet Commonly known as:  Samul Se Take 1 g by mouth two (2) times a day. tiZANidine 4 mg tablet Commonly known as:  Josiah Rebel Take 4 mg by mouth every six (6) hours as needed. VALIUM 5 mg tablet Generic drug:  diazePAM  
Take 5 mg by mouth every eight (8) hours as needed for Anxiety. Prescriptions Sent to Pharmacy Refills  
 acetaminophen (TYLENOL) 500 mg tablet 2 Sig: Take 1 Tab by mouth three (3) times daily as needed for Pain for up to 30 days. Indications: BACK PAIN, Pain Class: Normal  
 Pharmacy: [address] [place] [address] #: [phone] Route: Oral  
  
We Performed the Following AMB POC DRUG SCREEN () [ \[facility]\""] DRUG SCREEN [[id number] Custom] Follow-up Instructions Return in about 2 months (around 7/25/2018) for 30 min. Patient Instructions Learning About Benefits From Quitting Smoking How does quitting smoking make you healthier? If you're thinking about quitting smoking, you may have a few reasons to be smoke-free. Your health may be one of them. · When you quit smoking, you lower your risks for cancer, lung disease, heart attack, stroke, blood vessel disease, and blindness from macular degeneration. · When you're smoke-free, you get sick less often, and you heal faster. You are less likely to get colds, flu, bronchitis, and pneumonia. · As a nonsmoker, you may find that your mood is better and you are less stressed. When and how will you feel healthier? Quitting has real health benefits that start from day 1 of being smoke-free. And the longer you stay smoke-free, the healthier you get and the better you feel. The first hours · After just 20 minutes, your blood pressure and heart rate go down. That means there's less stress on your heart and blood vessels. · Within 12 hours, the level of carbon monoxide in your blood drops back to normal. That makes room for more oxygen. With more oxygen in your body, you may notice that you have more energy than when you smoked. After 2 weeks · Your lungs start to work better. · Your risk of heart attack starts to drop. After 1 month · When your lungs are clear, you cough less and breathe deeper, so it's easier to be active. · Your sense of taste and smell return. That means you can enjoy food more than you have since you started smoking. Over the years · After 1 year, your risk of heart disease is half what it would be if you kept smoking. · After 5 years, your risk of stroke starts to shrink. Within a few years after that, it's about the same as if you'd never smoked. · After 10 years, your risk of dying from lung cancer is cut by about half. And your risk for many other types of cancer is lower too. How would quitting help others in your life? When you quit smoking, you improve the health of everyone who now breathes in your smoke. · Their heart, lung, and cancer risks drop, much like yours. · They are sick less. For babies and small children, living smoke-free means they're less likely to have ear infections, pneumonia, and bronchitis. · If you're a woman who is or will be pregnant someday, quitting smoking means a healthier . · Children who are close to you are less likely to become adult smokers. Where can you learn more? Go to http://nancy-isabel.info/. Enter 052 [id number] 72 11 in the search box to learn more about \"Learning About Benefits From Quitting Smoking. \" Current as of: 2017 Content Version: 11.4 © 3955-8667 [employer]. Care instructions adapted under license by [employer] (which disclaims liability or warranty for this information).  If you have questions about a medical condition or this instruction, always ask your healthcare professional. [address] any warranty or liability for your use of this information. Learning About Sleeping Well What does sleeping well mean? Sleeping well means getting enough sleep. How much sleep is enough varies among people. The number of hours you sleep is not as important as how you feel when you wake up. If you do not feel refreshed, you probably need more sleep. Another sign of not getting enough sleep is feeling tired during the day. The average total nightly sleep time is 7½ to 8 hours. Healthy adults may need a little more or a little less than this. Why is getting enough sleep important? Getting enough quality sleep is a basic part of good health. When your sleep suffers, your mood and your thoughts can suffer too. You may find yourself feeling more grumpy or stressed. Not getting enough sleep also can lead to serious problems, including injury, accidents, anxiety, and depression. What might cause poor sleeping? Many things can cause sleep problems, including: · Stress. Stress can be caused by fear about a single event, such as giving a speech. Or you may have ongoing stress, such as worry about work or school. · Depression, anxiety, and other mental or emotional conditions. · Changes in your sleep habits or surroundings. This includes changes that happen where you sleep, such as noise, light, or sleeping in a different bed. It also includes changes in your sleep pattern, such as having jet lag or working a late shift. · Health problems, such as pain, breathing problems, and restless legs syndrome. · Lack of regular exercise. How can you help yourself? Here are some tips that may help you sleep more soundly and wake up feeling more refreshed. Your sleeping area · Use your bedroom only for sleeping and sex. A bit of light reading may help you fall asleep.  But if it doesn't, do your reading elsewhere in the house. Don't watch TV in bed. · Be sure your bed is big enough to stretch out comfortably, especially if you have a sleep partner. · Keep your bedroom quiet, dark, and cool. Use curtains, blinds, or a sleep mask to block out light. To block out noise, use earplugs, soothing music, or a \"white noise\" machine. Your evening and bedtime routine · Create a relaxing bedtime routine. You might want to take a warm shower or bath, listen to soothing music, or drink a cup of noncaffeinated tea. · Go to bed at the same time every night. And get up at the same time every morning, even if you feel tired. What to avoid · Limit caffeine (coffee, tea, caffeinated sodas) during the day, and don't have any for at least 4 to 6 hours before bedtime. · Don't drink alcohol before bedtime. Alcohol can cause you to wake up more often during the night. · Don't smoke or use tobacco, especially in the evening. Nicotine can keep you awake. · Don't take naps during the day, especially close to bedtime. · Don't lie in bed awake for too long. If you can't fall asleep, or if you wake up in the middle of the night and can't get back to sleep within 15 minutes or so, get out of bed and go to another room until you feel sleepy. · Don't take medicine right before bed that may keep you awake or make you feel hyper or energized. Your doctor can tell you if your medicine may do this and if you can take it earlier in the day. If you can't sleep · Imagine yourself in a peaceful, pleasant scene. Focus on the details and feelings of being in a place that is relaxing. · Get up and do a quiet or boring activity until you feel sleepy. · Don't drink any liquids after 6 p.m. if you wake up often because you have to go to the bathroom. Where can you learn more? Go to http://nancy-isabel.info/. Enter V218 in the search box to learn more about \"Learning About Sleeping Well. \" Current as of: May 12, 2017 Content Version: 11.4 © 7375-4992 Healthwise, Incorporated. Care instructions adapted under license by [employer] (which disclaims liability or warranty for this information). If you have questions about a medical condition or this instruction, always ask your healthcare professional. [address] any warranty or liability for your use of this information. Introducing [facility]! [facility] introduces Mov[employer] patient portal. Now you can access parts of your medical record, email your doctor's office, and request medication refills online. 1. In your internet browser, go to [email or web address] 2. Click on the First Time User? Click Here link in the Sign In box. You will see the New Member Sign Up page. 3. Enter your Movaya Access Code exactly as it appears below. You will not need to use this code after youve completed the sign-up process. If you do not sign up before the expiration date, you must request a new code. · Movaya Access Code: [email or web address] Expires: 8/23/2018  9:16 AM 
 
4. Enter the last four digits of your Social Security Number (xxxx) and Date of Birth (mm/dd/yyyy) as indicated and click Submit. You will be taken to the next sign-up page. 5. Create a Movaya ID. This will be your Movaya login ID and cannot be changed, so think of one that is secure and easy to remember. 6. Create a Movaya password. You can change your password at any time. 7. Enter your Password Reset Question and Answer. This can be used at a later time if you forget your password. 8. Enter your e-mail address. You will receive e-mail notification when new information is available in [address] 9. Click Sign Up. You can now view and download portions of your medical record. 10. Click the Download Summary menu link to download a portable copy of your medical information.  
 
If you have questions, please visit the Frequently Asked Questions section of the Recommend. Remember, [employer] is NOT to be used for urgent needs. For medical emergencies, dial 911. Now available from your iPhone and Android! Please provide this summary of care documentation to your next provider. Your primary care clinician is listed as Lily Huerta. If you have any questions after today's visit, please call [phone].

## 2018-05-25 NOTE — PROGRESS NOTES
Nursing Notes    Patient presents to the office today in follow-up. Patient rates her pain at 8/10 on the numerical pain scale. Reviewed medications with counts as follows:    Rx Date filled Qty Dispensed Pill Count Last Dose Short   Morphine sulfate ER 30 mg 01/22/18 60 0 ? no   Oxycodone 20 mg  01/22/18 120 0 ? no                         POC UDS was performed in office today    Any new labs or imaging since last appointment? YES. Pt states that she had a CT scan done of her abdomen at [facility]. She also had some labs done with her pcp    Have you been to an emergency room (ER) or urgent care clinic since your last visit? NO            Have you been hospitalized since your last visit? NO     If yes, where, when, and reason for visit? Have you seen or consulted any other health care providers outside of the [address] [place] [address]  since your last visit? YES     If yes, where, when, and reason for visit? Pcp, psychiatry, GI. Pt will see cardiology and endocrinology in June and October    Ms. Kristin Burr has a reminder for a \"due or due soon\" health maintenance. I have asked that she contact her primary care provider for follow-up on this health maintenance.

## 2018-05-29 ENCOUNTER — DOCUMENTATION ONLY (OUTPATIENT)
Dept: PAIN MANAGEMENT | Age: 63
End: 2018-05-29

## 2018-05-29 NOTE — PROGRESS NOTES
Patient came into the office on 5/29/18 and stated that she will not be coming back to [facility] and needs to be voluntarily discharged.

## (undated) DEVICE — SOLUTION IV 250ML 0.9% SOD CHL CLR INJ FLX BG CONT PRT CLSR

## (undated) DEVICE — (D)BNDG ADHESIVE FABRIC 3/4X3 -- DISC BY MFR USE ITEM 357960

## (undated) DEVICE — DRAPE TWL SURG 16X26IN BLU ORB04] ALLCARE INC]

## (undated) DEVICE — SET EXTN SM 0.5ML L13IN BOR W/O INJ SITE

## (undated) DEVICE — MIRAGE SWIFT II PILLOW LGE: Brand: MIRAGE SWIFT II

## (undated) DEVICE — TRAY SUPP STD NO DRUG W EXTENSION SET

## (undated) DEVICE — SYR 10ML CTRL LR LCK NSAF LF --

## (undated) DEVICE — CUFF BLD PRESSURE MONITORING LNG AD 23-33 CM 1 TUBE MY CUF